# Patient Record
Sex: FEMALE | URBAN - METROPOLITAN AREA
[De-identification: names, ages, dates, MRNs, and addresses within clinical notes are randomized per-mention and may not be internally consistent; named-entity substitution may affect disease eponyms.]

---

## 2017-01-02 ENCOUNTER — COMMUNICATION - HEALTHEAST (OUTPATIENT)
Dept: PALLIATIVE MEDICINE | Facility: CLINIC | Age: 52
End: 2017-01-02

## 2017-01-02 DIAGNOSIS — G89.29 CHRONIC PAIN: ICD-10-CM

## 2017-01-09 ENCOUNTER — COMMUNICATION - HEALTHEAST (OUTPATIENT)
Dept: PALLIATIVE MEDICINE | Facility: CLINIC | Age: 52
End: 2017-01-09

## 2017-01-12 ENCOUNTER — COMMUNICATION - HEALTHEAST (OUTPATIENT)
Dept: PALLIATIVE MEDICINE | Facility: OTHER | Age: 52
End: 2017-01-12

## 2017-01-12 DIAGNOSIS — G89.29 CHRONIC PAIN: ICD-10-CM

## 2017-02-06 ENCOUNTER — HOSPITAL ENCOUNTER (OUTPATIENT)
Dept: PALLIATIVE MEDICINE | Facility: OTHER | Age: 52
Discharge: HOME | End: 2017-02-06
Attending: NURSE PRACTITIONER

## 2017-02-06 ENCOUNTER — COMMUNICATION - HEALTHEAST (OUTPATIENT)
Dept: TELEHEALTH | Facility: CLINIC | Age: 52
End: 2017-02-06

## 2017-02-06 DIAGNOSIS — G89.29 OTHER CHRONIC PAIN: ICD-10-CM

## 2017-02-06 DIAGNOSIS — M51.379 DEGENERATION OF LUMBAR OR LUMBOSACRAL INTERVERTEBRAL DISC: ICD-10-CM

## 2017-02-06 DIAGNOSIS — M46.1 SACROILIITIS, NOT ELSEWHERE CLASSIFIED (H): ICD-10-CM

## 2017-02-06 DIAGNOSIS — G89.4 CHRONIC PAIN SYNDROME: ICD-10-CM

## 2017-02-06 ASSESSMENT — MIFFLIN-ST. JEOR: SCORE: 1271.28

## 2017-03-31 ENCOUNTER — COMMUNICATION - HEALTHEAST (OUTPATIENT)
Dept: PALLIATIVE MEDICINE | Facility: CLINIC | Age: 52
End: 2017-03-31

## 2017-03-31 DIAGNOSIS — G89.29 CHRONIC PAIN: ICD-10-CM

## 2017-04-14 ENCOUNTER — COMMUNICATION - HEALTHEAST (OUTPATIENT)
Dept: PALLIATIVE MEDICINE | Facility: OTHER | Age: 52
End: 2017-04-14

## 2017-04-14 DIAGNOSIS — G89.29 CHRONIC PAIN: ICD-10-CM

## 2017-04-24 ENCOUNTER — HOSPITAL ENCOUNTER (OUTPATIENT)
Dept: PALLIATIVE MEDICINE | Facility: OTHER | Age: 52
Discharge: HOME | End: 2017-04-24
Attending: NURSE PRACTITIONER

## 2017-04-24 DIAGNOSIS — G89.4 CHRONIC PAIN SYNDROME: ICD-10-CM

## 2017-04-24 DIAGNOSIS — K86.1 CHRONIC PANCREATITIS, UNSPECIFIED PANCREATITIS TYPE (H): ICD-10-CM

## 2017-04-24 ASSESSMENT — MIFFLIN-ST. JEOR: SCORE: 1316.64

## 2017-06-28 ENCOUNTER — HOSPITAL ENCOUNTER (OUTPATIENT)
Dept: PALLIATIVE MEDICINE | Facility: OTHER | Age: 52
Discharge: HOME | End: 2017-06-28
Attending: NURSE PRACTITIONER

## 2017-06-28 DIAGNOSIS — M51.379 DEGENERATION OF LUMBAR OR LUMBOSACRAL INTERVERTEBRAL DISC: ICD-10-CM

## 2017-06-28 DIAGNOSIS — K85.90 PANCREATITIS: ICD-10-CM

## 2017-06-28 DIAGNOSIS — M50.30 DEGENERATION OF CERVICAL INTERVERTEBRAL DISC: ICD-10-CM

## 2017-06-28 DIAGNOSIS — G89.4 CHRONIC PAIN SYNDROME: ICD-10-CM

## 2017-06-28 DIAGNOSIS — M46.1 SACROILIITIS, NOT ELSEWHERE CLASSIFIED (H): ICD-10-CM

## 2017-06-28 ASSESSMENT — MIFFLIN-ST. JEOR: SCORE: 1293.96

## 2017-07-03 ENCOUNTER — COMMUNICATION - HEALTHEAST (OUTPATIENT)
Dept: PALLIATIVE MEDICINE | Facility: OTHER | Age: 52
End: 2017-07-03

## 2017-07-03 DIAGNOSIS — G89.29 CHRONIC PAIN: ICD-10-CM

## 2017-07-21 ENCOUNTER — COMMUNICATION - HEALTHEAST (OUTPATIENT)
Dept: PALLIATIVE MEDICINE | Facility: OTHER | Age: 52
End: 2017-07-21

## 2017-07-21 DIAGNOSIS — G89.29 CHRONIC PAIN: ICD-10-CM

## 2017-08-08 ENCOUNTER — HOSPITAL ENCOUNTER (OUTPATIENT)
Dept: PALLIATIVE MEDICINE | Facility: OTHER | Age: 52
Discharge: HOME | End: 2017-08-08
Attending: COUNSELOR

## 2017-08-08 DIAGNOSIS — F41.0 PANIC ATTACKS: ICD-10-CM

## 2017-08-08 DIAGNOSIS — F41.1 GENERALIZED ANXIETY DISORDER: ICD-10-CM

## 2017-08-08 DIAGNOSIS — F06.30 MOOD DISORDER DUE TO MEDICAL CONDITION: ICD-10-CM

## 2017-08-08 DIAGNOSIS — G89.4 CHRONIC PAIN SYNDROME: ICD-10-CM

## 2017-08-16 ENCOUNTER — HOSPITAL ENCOUNTER (OUTPATIENT)
Dept: PALLIATIVE MEDICINE | Facility: OTHER | Age: 52
Discharge: HOME | End: 2017-08-16
Attending: COUNSELOR

## 2017-08-16 ENCOUNTER — AMBULATORY - HEALTHEAST (OUTPATIENT)
Dept: PALLIATIVE MEDICINE | Facility: OTHER | Age: 52
End: 2017-08-16

## 2017-08-16 DIAGNOSIS — G89.4 CHRONIC PAIN SYNDROME: ICD-10-CM

## 2017-08-16 DIAGNOSIS — F41.1 GENERALIZED ANXIETY DISORDER: ICD-10-CM

## 2017-08-16 DIAGNOSIS — F06.30 MOOD DISORDER DUE TO A GENERAL MEDICAL CONDITION: ICD-10-CM

## 2017-08-16 DIAGNOSIS — F41.0 PANIC ATTACKS: ICD-10-CM

## 2017-08-30 ENCOUNTER — HOSPITAL ENCOUNTER (OUTPATIENT)
Dept: PALLIATIVE MEDICINE | Facility: OTHER | Age: 52
Discharge: HOME | End: 2017-08-30
Attending: COUNSELOR

## 2017-08-30 DIAGNOSIS — G89.4 CHRONIC PAIN SYNDROME: ICD-10-CM

## 2017-08-30 DIAGNOSIS — F41.1 GENERALIZED ANXIETY DISORDER: ICD-10-CM

## 2017-08-30 DIAGNOSIS — F41.0 PANIC ATTACKS: ICD-10-CM

## 2017-08-30 DIAGNOSIS — F06.30 MOOD DISORDER DUE TO A GENERAL MEDICAL CONDITION: ICD-10-CM

## 2017-09-15 ENCOUNTER — HOSPITAL ENCOUNTER (OUTPATIENT)
Dept: PALLIATIVE MEDICINE | Facility: OTHER | Age: 52
Discharge: HOME | End: 2017-09-15
Attending: COUNSELOR

## 2017-09-15 DIAGNOSIS — G89.4 CHRONIC PAIN SYNDROME: ICD-10-CM

## 2017-09-15 DIAGNOSIS — F41.0 PANIC ATTACKS: ICD-10-CM

## 2017-09-15 DIAGNOSIS — F41.1 GENERALIZED ANXIETY DISORDER: ICD-10-CM

## 2017-09-15 DIAGNOSIS — F06.30 MOOD DISORDER DUE TO A GENERAL MEDICAL CONDITION: ICD-10-CM

## 2017-10-04 ENCOUNTER — HOSPITAL ENCOUNTER (OUTPATIENT)
Dept: PALLIATIVE MEDICINE | Facility: OTHER | Age: 52
Discharge: HOME | End: 2017-10-04
Attending: COUNSELOR

## 2017-10-04 DIAGNOSIS — F06.30 MOOD DISORDER DUE TO A GENERAL MEDICAL CONDITION: ICD-10-CM

## 2017-10-04 DIAGNOSIS — F41.0 PANIC ATTACKS: ICD-10-CM

## 2017-10-04 DIAGNOSIS — F41.1 GENERALIZED ANXIETY DISORDER: ICD-10-CM

## 2017-10-04 DIAGNOSIS — G89.4 CHRONIC PAIN SYNDROME: ICD-10-CM

## 2017-10-13 ENCOUNTER — HOSPITAL ENCOUNTER (OUTPATIENT)
Dept: PALLIATIVE MEDICINE | Facility: OTHER | Age: 52
Discharge: HOME | End: 2017-10-13
Attending: NURSE PRACTITIONER

## 2017-10-13 DIAGNOSIS — M51.379 DEGENERATION OF LUMBAR OR LUMBOSACRAL INTERVERTEBRAL DISC: ICD-10-CM

## 2017-10-13 DIAGNOSIS — M46.1 SACROILIITIS, NOT ELSEWHERE CLASSIFIED (H): ICD-10-CM

## 2017-10-13 DIAGNOSIS — M79.10 MYALGIA: ICD-10-CM

## 2017-10-13 DIAGNOSIS — G89.29 CHRONIC PAIN: ICD-10-CM

## 2017-10-13 ASSESSMENT — MIFFLIN-ST. JEOR: SCORE: 1293.96

## 2017-10-30 ENCOUNTER — COMMUNICATION - HEALTHEAST (OUTPATIENT)
Dept: PALLIATIVE MEDICINE | Facility: OTHER | Age: 52
End: 2017-10-30

## 2017-10-30 DIAGNOSIS — G89.29 CHRONIC PAIN: ICD-10-CM

## 2017-11-01 ENCOUNTER — HOSPITAL ENCOUNTER (OUTPATIENT)
Dept: PALLIATIVE MEDICINE | Facility: OTHER | Age: 52
Discharge: HOME | End: 2017-11-01
Attending: COUNSELOR

## 2017-11-01 DIAGNOSIS — F41.1 GENERALIZED ANXIETY DISORDER: ICD-10-CM

## 2017-11-01 DIAGNOSIS — F06.30 MOOD DISORDER DUE TO A GENERAL MEDICAL CONDITION: ICD-10-CM

## 2017-11-01 DIAGNOSIS — F41.0 PANIC ATTACKS: ICD-10-CM

## 2017-11-01 DIAGNOSIS — G89.4 CHRONIC PAIN SYNDROME: ICD-10-CM

## 2017-11-03 ENCOUNTER — COMMUNICATION - HEALTHEAST (OUTPATIENT)
Dept: PALLIATIVE MEDICINE | Facility: OTHER | Age: 52
End: 2017-11-03

## 2017-11-03 DIAGNOSIS — Z72.0 TOBACCO ABUSE: ICD-10-CM

## 2017-11-03 DIAGNOSIS — G89.29 CHRONIC PAIN: ICD-10-CM

## 2017-11-03 DIAGNOSIS — K85.90 PANCREATITIS: ICD-10-CM

## 2017-11-07 ENCOUNTER — COMMUNICATION - HEALTHEAST (OUTPATIENT)
Dept: PALLIATIVE MEDICINE | Facility: OTHER | Age: 52
End: 2017-11-07

## 2017-11-08 ENCOUNTER — HOSPITAL ENCOUNTER (OUTPATIENT)
Dept: PALLIATIVE MEDICINE | Facility: OTHER | Age: 52
Discharge: HOME | End: 2017-11-08
Attending: PAIN MEDICINE

## 2017-11-08 DIAGNOSIS — M79.18 MYOFASCIAL PAIN: ICD-10-CM

## 2017-11-09 ENCOUNTER — COMMUNICATION - HEALTHEAST (OUTPATIENT)
Dept: PALLIATIVE MEDICINE | Facility: OTHER | Age: 52
End: 2017-11-09

## 2017-11-22 ENCOUNTER — AMBULATORY - HEALTHEAST (OUTPATIENT)
Dept: PALLIATIVE MEDICINE | Facility: OTHER | Age: 52
End: 2017-11-22

## 2017-11-27 ENCOUNTER — COMMUNICATION - HEALTHEAST (OUTPATIENT)
Dept: PALLIATIVE MEDICINE | Facility: OTHER | Age: 52
End: 2017-11-27

## 2017-11-29 ENCOUNTER — HOSPITAL ENCOUNTER (OUTPATIENT)
Dept: PALLIATIVE MEDICINE | Facility: OTHER | Age: 52
Discharge: HOME | End: 2017-11-29
Attending: COUNSELOR

## 2017-11-29 DIAGNOSIS — F06.30 MOOD DISORDER DUE TO A GENERAL MEDICAL CONDITION: ICD-10-CM

## 2017-11-29 DIAGNOSIS — F41.1 GENERALIZED ANXIETY DISORDER: ICD-10-CM

## 2017-11-29 DIAGNOSIS — Z87.820 HISTORY OF TRAUMATIC BRAIN INJURY: ICD-10-CM

## 2017-11-29 DIAGNOSIS — G89.4 CHRONIC PAIN SYNDROME: ICD-10-CM

## 2017-12-15 ENCOUNTER — HOSPITAL ENCOUNTER (OUTPATIENT)
Dept: PALLIATIVE MEDICINE | Facility: OTHER | Age: 52
Discharge: HOME | End: 2017-12-15
Attending: NURSE PRACTITIONER

## 2017-12-15 DIAGNOSIS — M46.1 SACROILIITIS, NOT ELSEWHERE CLASSIFIED (H): ICD-10-CM

## 2017-12-15 DIAGNOSIS — G89.29 CHRONIC PAIN: ICD-10-CM

## 2017-12-15 DIAGNOSIS — M51.379 DEGENERATION OF LUMBAR OR LUMBOSACRAL INTERVERTEBRAL DISC: ICD-10-CM

## 2017-12-15 ASSESSMENT — MIFFLIN-ST. JEOR: SCORE: 1293.96

## 2018-01-10 ENCOUNTER — HOSPITAL ENCOUNTER (OUTPATIENT)
Dept: PALLIATIVE MEDICINE | Facility: OTHER | Age: 53
Discharge: HOME | End: 2018-01-10
Attending: COUNSELOR

## 2018-01-10 ENCOUNTER — AMBULATORY - HEALTHEAST (OUTPATIENT)
Dept: PALLIATIVE MEDICINE | Facility: OTHER | Age: 53
End: 2018-01-10

## 2018-01-10 DIAGNOSIS — F41.0 PANIC ATTACKS: ICD-10-CM

## 2018-01-10 DIAGNOSIS — F06.30 MOOD DISORDER DUE TO A GENERAL MEDICAL CONDITION: ICD-10-CM

## 2018-01-10 DIAGNOSIS — G89.4 CHRONIC PAIN SYNDROME: ICD-10-CM

## 2018-01-10 DIAGNOSIS — F41.1 GENERALIZED ANXIETY DISORDER: ICD-10-CM

## 2018-02-24 ENCOUNTER — CARE COORDINATION (OUTPATIENT)
Dept: GASTROENTEROLOGY | Facility: CLINIC | Age: 53
End: 2018-02-24

## 2018-02-24 NOTE — PROGRESS NOTES
"Returning patient: message from call center -   Pt was last seen by Dr. Browning in March on 2016. Pt states she is now being referred back to Dr. Browning by Dr Sanches at [facility]. Pt stated she \"does not care who she sees but has seen Nam in the past\".  Pt stated that Dr Sanches did mention that pt should consider the [place] Procedure.     I did ask pt to have her recent records from [facility] sent and she stated all of her records are available via [facility]. Pt can be reached at [phone]       2/24/2018: some records are in [facility]where: no imaging can be seen in [facility]where- she will need to send all imaging to us for review.   - routed to Dr. Dumont to review and advise in the mean time.     Julissa TODD RN Coordinator  Dr. Dumont, Dr. Browning & Dr. Hdz   [facility]  [phone]          "

## 2018-03-02 ENCOUNTER — HOSPITAL ENCOUNTER (OUTPATIENT)
Dept: PALLIATIVE MEDICINE | Facility: OTHER | Age: 53
Discharge: HOME | End: 2018-03-02
Attending: COUNSELOR

## 2018-03-02 DIAGNOSIS — G89.4 CHRONIC PAIN SYNDROME: ICD-10-CM

## 2018-03-02 DIAGNOSIS — F41.0 PANIC ATTACKS: ICD-10-CM

## 2018-03-02 DIAGNOSIS — F06.30 MOOD DISORDER DUE TO A GENERAL MEDICAL CONDITION: ICD-10-CM

## 2018-03-02 DIAGNOSIS — F41.1 GENERALIZED ANXIETY DISORDER: ICD-10-CM

## 2018-03-21 ENCOUNTER — HOSPITAL ENCOUNTER (OUTPATIENT)
Dept: PALLIATIVE MEDICINE | Facility: OTHER | Age: 53
Discharge: HOME | End: 2018-03-21
Attending: NURSE PRACTITIONER

## 2018-03-21 DIAGNOSIS — M50.30 DEGENERATION OF CERVICAL INTERVERTEBRAL DISC: ICD-10-CM

## 2018-03-21 DIAGNOSIS — M51.379 DEGENERATION OF LUMBAR OR LUMBOSACRAL INTERVERTEBRAL DISC: ICD-10-CM

## 2018-03-21 DIAGNOSIS — G89.29 OTHER CHRONIC PAIN: ICD-10-CM

## 2018-03-21 DIAGNOSIS — Z87.19 H/O CHRONIC PANCREATITIS: ICD-10-CM

## 2018-03-21 DIAGNOSIS — M17.12 OSTEOARTHRITIS OF LEFT KNEE, UNSPECIFIED OSTEOARTHRITIS TYPE: ICD-10-CM

## 2018-03-21 ASSESSMENT — MIFFLIN-ST. JEOR: SCORE: 1293.96

## 2018-05-04 ENCOUNTER — COMMUNICATION - HEALTHEAST (OUTPATIENT)
Dept: PALLIATIVE MEDICINE | Facility: OTHER | Age: 53
End: 2018-05-04

## 2018-05-04 DIAGNOSIS — G89.29 CHRONIC PAIN: ICD-10-CM

## 2018-06-01 ENCOUNTER — HOSPITAL ENCOUNTER (OUTPATIENT)
Dept: PALLIATIVE MEDICINE | Facility: OTHER | Age: 53
Discharge: HOME | End: 2018-06-01
Attending: COUNSELOR

## 2018-06-01 ENCOUNTER — AMBULATORY - HEALTHEAST (OUTPATIENT)
Dept: PALLIATIVE MEDICINE | Facility: OTHER | Age: 53
End: 2018-06-01

## 2018-06-01 DIAGNOSIS — F06.30 MOOD DISORDER DUE TO A GENERAL MEDICAL CONDITION: ICD-10-CM

## 2018-06-01 DIAGNOSIS — G89.4 CHRONIC PAIN SYNDROME: ICD-10-CM

## 2018-06-01 DIAGNOSIS — Z87.820 HISTORY OF TRAUMATIC BRAIN INJURY: ICD-10-CM

## 2018-06-01 DIAGNOSIS — F41.0 PANIC ATTACKS: ICD-10-CM

## 2018-06-01 DIAGNOSIS — F41.1 GENERALIZED ANXIETY DISORDER: ICD-10-CM

## 2018-06-22 ENCOUNTER — HOSPITAL ENCOUNTER (OUTPATIENT)
Dept: PALLIATIVE MEDICINE | Facility: OTHER | Age: 53
Discharge: HOME | End: 2018-06-22
Attending: COUNSELOR

## 2018-06-22 DIAGNOSIS — F06.30 MOOD DISORDER DUE TO A GENERAL MEDICAL CONDITION: ICD-10-CM

## 2018-06-22 DIAGNOSIS — Z87.820 HISTORY OF TRAUMATIC BRAIN INJURY: ICD-10-CM

## 2018-06-22 DIAGNOSIS — F41.0 PANIC ATTACKS: ICD-10-CM

## 2018-06-22 DIAGNOSIS — G89.4 CHRONIC PAIN SYNDROME: ICD-10-CM

## 2018-06-22 DIAGNOSIS — F41.1 GAD (GENERALIZED ANXIETY DISORDER): ICD-10-CM

## 2018-07-02 ENCOUNTER — COMMUNICATION - HEALTHEAST (OUTPATIENT)
Dept: PALLIATIVE MEDICINE | Facility: OTHER | Age: 53
End: 2018-07-02

## 2018-07-11 ENCOUNTER — HOSPITAL ENCOUNTER (OUTPATIENT)
Dept: PALLIATIVE MEDICINE | Facility: OTHER | Age: 53
Discharge: HOME | End: 2018-07-11
Attending: COUNSELOR

## 2018-07-11 DIAGNOSIS — Z87.820 HISTORY OF TRAUMATIC BRAIN INJURY: ICD-10-CM

## 2018-07-11 DIAGNOSIS — G89.4 CHRONIC PAIN SYNDROME: ICD-10-CM

## 2018-07-11 DIAGNOSIS — F41.0 PANIC ATTACKS: ICD-10-CM

## 2018-07-11 DIAGNOSIS — F41.1 GAD (GENERALIZED ANXIETY DISORDER): ICD-10-CM

## 2018-07-11 DIAGNOSIS — F06.30 MOOD DISORDER DUE TO A GENERAL MEDICAL CONDITION: ICD-10-CM

## 2018-07-31 ENCOUNTER — COMMUNICATION - HEALTHEAST (OUTPATIENT)
Dept: PALLIATIVE MEDICINE | Facility: OTHER | Age: 53
End: 2018-07-31

## 2018-08-03 ENCOUNTER — HOSPITAL ENCOUNTER (OUTPATIENT)
Dept: PALLIATIVE MEDICINE | Facility: OTHER | Age: 53
Discharge: HOME | End: 2018-08-03
Attending: COUNSELOR

## 2018-08-03 DIAGNOSIS — Z87.820 HISTORY OF TRAUMATIC BRAIN INJURY: ICD-10-CM

## 2018-08-03 DIAGNOSIS — G89.4 CHRONIC PAIN SYNDROME: ICD-10-CM

## 2018-08-03 DIAGNOSIS — F41.0 PANIC ATTACKS: ICD-10-CM

## 2018-08-03 DIAGNOSIS — F06.30 MOOD DISORDER DUE TO MEDICAL CONDITION: ICD-10-CM

## 2018-08-03 DIAGNOSIS — F41.1 GAD (GENERALIZED ANXIETY DISORDER): ICD-10-CM

## 2018-08-23 ENCOUNTER — HOSPITAL ENCOUNTER (OUTPATIENT)
Dept: PALLIATIVE MEDICINE | Facility: OTHER | Age: 53
Discharge: HOME | End: 2018-08-23
Attending: COUNSELOR

## 2018-08-23 ENCOUNTER — RECORDS - HEALTHEAST (OUTPATIENT)
Dept: ADMINISTRATIVE | Facility: OTHER | Age: 53
End: 2018-08-23

## 2018-08-23 DIAGNOSIS — Z87.828 HISTORY OF HEAD INJURY: ICD-10-CM

## 2018-08-23 DIAGNOSIS — G89.4 CHRONIC PAIN SYNDROME: ICD-10-CM

## 2018-08-23 DIAGNOSIS — F41.1 GAD (GENERALIZED ANXIETY DISORDER): ICD-10-CM

## 2018-08-23 DIAGNOSIS — F41.0 PANIC ATTACKS: ICD-10-CM

## 2018-08-23 DIAGNOSIS — F06.30 MOOD DISORDER DUE TO A GENERAL MEDICAL CONDITION: ICD-10-CM

## 2018-09-21 ENCOUNTER — HOSPITAL ENCOUNTER (OUTPATIENT)
Dept: PALLIATIVE MEDICINE | Facility: OTHER | Age: 53
Discharge: HOME | End: 2018-09-21
Attending: NURSE PRACTITIONER

## 2018-09-21 DIAGNOSIS — M46.1 SACROILIITIS, NOT ELSEWHERE CLASSIFIED (H): ICD-10-CM

## 2018-09-21 DIAGNOSIS — M51.379 DEGENERATION OF LUMBAR OR LUMBOSACRAL INTERVERTEBRAL DISC: ICD-10-CM

## 2018-09-21 ASSESSMENT — MIFFLIN-ST. JEOR: SCORE: 1293.96

## 2018-09-27 ENCOUNTER — AMBULATORY - HEALTHEAST (OUTPATIENT)
Dept: PALLIATIVE MEDICINE | Facility: OTHER | Age: 53
End: 2018-09-27

## 2018-09-27 ENCOUNTER — HOSPITAL ENCOUNTER (OUTPATIENT)
Dept: PALLIATIVE MEDICINE | Facility: OTHER | Age: 53
Discharge: HOME | End: 2018-09-27
Attending: COUNSELOR

## 2018-09-27 DIAGNOSIS — F06.30 MOOD DISORDER DUE TO A GENERAL MEDICAL CONDITION: ICD-10-CM

## 2018-09-27 DIAGNOSIS — G89.4 CHRONIC PAIN SYNDROME: ICD-10-CM

## 2018-09-27 DIAGNOSIS — F41.0 PANIC ATTACKS: ICD-10-CM

## 2018-09-27 DIAGNOSIS — F43.12 CHRONIC POST-TRAUMATIC STRESS DISORDER (PTSD): ICD-10-CM

## 2018-09-27 DIAGNOSIS — F41.1 GAD (GENERALIZED ANXIETY DISORDER): ICD-10-CM

## 2018-09-27 DIAGNOSIS — Z87.820 HISTORY OF TRAUMATIC BRAIN INJURY: ICD-10-CM

## 2018-10-12 ENCOUNTER — HOSPITAL ENCOUNTER (OUTPATIENT)
Dept: PALLIATIVE MEDICINE | Facility: OTHER | Age: 53
Discharge: HOME | End: 2018-10-12
Attending: COUNSELOR

## 2018-10-12 DIAGNOSIS — F41.0 PANIC ATTACKS: ICD-10-CM

## 2018-10-12 DIAGNOSIS — G89.4 CHRONIC PAIN SYNDROME: ICD-10-CM

## 2018-10-12 DIAGNOSIS — F06.30 MOOD DISORDER DUE TO A GENERAL MEDICAL CONDITION: ICD-10-CM

## 2018-10-12 DIAGNOSIS — Z87.820 HISTORY OF TRAUMATIC BRAIN INJURY: ICD-10-CM

## 2018-10-12 DIAGNOSIS — F41.1 GAD (GENERALIZED ANXIETY DISORDER): ICD-10-CM

## 2018-10-12 DIAGNOSIS — F43.12 CHRONIC POST-TRAUMATIC STRESS DISORDER (PTSD): ICD-10-CM

## 2018-11-27 ENCOUNTER — HOSPITAL ENCOUNTER (OUTPATIENT)
Dept: PALLIATIVE MEDICINE | Facility: OTHER | Age: 53
Discharge: HOME | End: 2018-11-27
Attending: COUNSELOR

## 2018-11-27 DIAGNOSIS — G89.4 CHRONIC PAIN SYNDROME: ICD-10-CM

## 2018-11-27 DIAGNOSIS — F43.12 CHRONIC POST-TRAUMATIC STRESS DISORDER (PTSD): ICD-10-CM

## 2018-11-27 DIAGNOSIS — F41.0 PANIC ATTACKS: ICD-10-CM

## 2018-11-27 DIAGNOSIS — Z87.828 HISTORY OF HEAD INJURY: ICD-10-CM

## 2018-11-27 DIAGNOSIS — F06.30 MOOD DISORDER DUE TO MEDICAL CONDITION: ICD-10-CM

## 2018-11-27 DIAGNOSIS — F41.1 GENERALIZED ANXIETY DISORDER: ICD-10-CM

## 2018-11-30 ENCOUNTER — COMMUNICATION - HEALTHEAST (OUTPATIENT)
Dept: PALLIATIVE MEDICINE | Facility: OTHER | Age: 53
End: 2018-11-30

## 2018-12-12 ENCOUNTER — HOSPITAL ENCOUNTER (OUTPATIENT)
Dept: PALLIATIVE MEDICINE | Facility: OTHER | Age: 53
Discharge: HOME | End: 2018-12-12
Attending: COUNSELOR

## 2018-12-12 DIAGNOSIS — F43.10 PTSD (POST-TRAUMATIC STRESS DISORDER): ICD-10-CM

## 2018-12-12 DIAGNOSIS — F41.0 PANIC ATTACKS: ICD-10-CM

## 2018-12-12 DIAGNOSIS — F06.30 MOOD DISORDER DUE TO A GENERAL MEDICAL CONDITION: ICD-10-CM

## 2018-12-12 DIAGNOSIS — G89.4 CHRONIC PAIN SYNDROME: ICD-10-CM

## 2018-12-12 DIAGNOSIS — F41.1 GENERALIZED ANXIETY DISORDER: ICD-10-CM

## 2018-12-12 DIAGNOSIS — Z87.828 HISTORY OF HEAD INJURY: ICD-10-CM

## 2019-01-07 ENCOUNTER — HOSPITAL ENCOUNTER (OUTPATIENT)
Dept: PALLIATIVE MEDICINE | Facility: OTHER | Age: 54
Discharge: HOME | End: 2019-01-07
Attending: COUNSELOR

## 2019-01-07 DIAGNOSIS — F41.0 PANIC ATTACKS: ICD-10-CM

## 2019-01-07 DIAGNOSIS — F41.1 GENERALIZED ANXIETY DISORDER: ICD-10-CM

## 2019-01-07 DIAGNOSIS — Z87.828 HISTORY OF HEAD INJURY: ICD-10-CM

## 2019-01-07 DIAGNOSIS — G89.4 CHRONIC PAIN SYNDROME: ICD-10-CM

## 2019-01-07 DIAGNOSIS — F43.12 CHRONIC POST-TRAUMATIC STRESS DISORDER (PTSD): ICD-10-CM

## 2019-01-07 DIAGNOSIS — F06.30 MOOD DISORDER DUE TO A GENERAL MEDICAL CONDITION: ICD-10-CM

## 2019-01-29 ENCOUNTER — HOSPITAL ENCOUNTER (OUTPATIENT)
Dept: PALLIATIVE MEDICINE | Facility: OTHER | Age: 54
Discharge: HOME | End: 2019-01-29
Attending: COUNSELOR

## 2019-01-29 ENCOUNTER — AMBULATORY - HEALTHEAST (OUTPATIENT)
Dept: PALLIATIVE MEDICINE | Facility: OTHER | Age: 54
End: 2019-01-29

## 2019-01-29 DIAGNOSIS — F43.12 CHRONIC POST-TRAUMATIC STRESS DISORDER (PTSD): ICD-10-CM

## 2019-01-29 DIAGNOSIS — G89.4 CHRONIC PAIN SYNDROME: ICD-10-CM

## 2019-01-29 DIAGNOSIS — Z87.828 HISTORY OF HEAD INJURY: ICD-10-CM

## 2019-01-29 DIAGNOSIS — F41.0 PANIC ATTACKS: ICD-10-CM

## 2019-01-29 DIAGNOSIS — F06.30 MOOD DISORDER DUE TO A GENERAL MEDICAL CONDITION: ICD-10-CM

## 2019-01-29 DIAGNOSIS — F41.1 GENERALIZED ANXIETY DISORDER: ICD-10-CM

## 2019-02-14 ENCOUNTER — HOSPITAL ENCOUNTER (OUTPATIENT)
Dept: PALLIATIVE MEDICINE | Facility: OTHER | Age: 54
Discharge: HOME | End: 2019-02-14
Attending: COUNSELOR

## 2019-02-14 DIAGNOSIS — G89.4 CHRONIC PAIN SYNDROME: ICD-10-CM

## 2019-02-14 DIAGNOSIS — Z87.828 HISTORY OF HEAD INJURY: ICD-10-CM

## 2019-02-14 DIAGNOSIS — F43.12 CHRONIC POST-TRAUMATIC STRESS DISORDER (PTSD): ICD-10-CM

## 2019-02-14 DIAGNOSIS — F41.1 GENERALIZED ANXIETY DISORDER: ICD-10-CM

## 2019-02-14 DIAGNOSIS — F41.0 PANIC ATTACKS: ICD-10-CM

## 2019-02-14 DIAGNOSIS — F06.30 MOOD DISORDER DUE TO A GENERAL MEDICAL CONDITION: ICD-10-CM

## 2019-02-19 ASSESSMENT — MIFFLIN-ST. JEOR: SCORE: 1339.32

## 2019-02-21 ENCOUNTER — DOCUMENTATION ONLY (OUTPATIENT)
Dept: OTHER | Facility: CLINIC | Age: 54
End: 2019-02-21

## 2019-02-21 ENCOUNTER — SURGERY - HEALTHEAST (OUTPATIENT)
Dept: SURGERY | Facility: CLINIC | Age: 54
End: 2019-02-21

## 2019-02-21 ENCOUNTER — ANESTHESIA - HEALTHEAST (OUTPATIENT)
Dept: SURGERY | Facility: CLINIC | Age: 54
End: 2019-02-21

## 2019-02-21 ASSESSMENT — MIFFLIN-ST. JEOR: SCORE: 1335.63

## 2019-02-22 ASSESSMENT — MIFFLIN-ST. JEOR: SCORE: 1335.63

## 2019-05-23 ENCOUNTER — COMMUNICATION - HEALTHEAST (OUTPATIENT)
Dept: PALLIATIVE MEDICINE | Facility: OTHER | Age: 54
End: 2019-05-23

## 2019-06-01 ENCOUNTER — COMMUNICATION - HEALTHEAST (OUTPATIENT)
Dept: PALLIATIVE MEDICINE | Facility: OTHER | Age: 54
End: 2019-06-01

## 2019-06-01 DIAGNOSIS — G89.29 CHRONIC PAIN: ICD-10-CM

## 2019-06-04 ENCOUNTER — AMBULATORY - HEALTHEAST (OUTPATIENT)
Dept: PALLIATIVE MEDICINE | Facility: OTHER | Age: 54
End: 2019-06-04

## 2019-06-04 ENCOUNTER — HOSPITAL ENCOUNTER (OUTPATIENT)
Dept: PALLIATIVE MEDICINE | Facility: OTHER | Age: 54
Discharge: HOME | End: 2019-06-04
Attending: COUNSELOR

## 2019-06-04 DIAGNOSIS — F41.0 PANIC ATTACKS: ICD-10-CM

## 2019-06-04 DIAGNOSIS — F43.12 CHRONIC POST-TRAUMATIC STRESS DISORDER (PTSD): ICD-10-CM

## 2019-06-04 DIAGNOSIS — G89.4 CHRONIC PAIN SYNDROME: ICD-10-CM

## 2019-06-04 DIAGNOSIS — F41.1 GENERALIZED ANXIETY DISORDER: ICD-10-CM

## 2019-06-04 DIAGNOSIS — F33.2 SEVERE EPISODE OF RECURRENT MAJOR DEPRESSIVE DISORDER, WITHOUT PSYCHOTIC FEATURES (H): ICD-10-CM

## 2019-06-04 DIAGNOSIS — Z87.828 HISTORY OF HEAD INJURY: ICD-10-CM

## 2019-06-12 ENCOUNTER — HOSPITAL ENCOUNTER (OUTPATIENT)
Dept: PALLIATIVE MEDICINE | Facility: OTHER | Age: 54
Discharge: HOME | End: 2019-06-12
Attending: COUNSELOR

## 2019-06-12 DIAGNOSIS — F41.0 PANIC ATTACKS: ICD-10-CM

## 2019-06-12 DIAGNOSIS — Z87.828 HISTORY OF HEAD INJURY: ICD-10-CM

## 2019-06-12 DIAGNOSIS — F43.12 CHRONIC POST-TRAUMATIC STRESS DISORDER (PTSD): ICD-10-CM

## 2019-06-12 DIAGNOSIS — F33.2 SEVERE EPISODE OF RECURRENT MAJOR DEPRESSIVE DISORDER, WITHOUT PSYCHOTIC FEATURES (H): ICD-10-CM

## 2019-06-12 DIAGNOSIS — G89.4 CHRONIC PAIN SYNDROME: ICD-10-CM

## 2019-06-12 DIAGNOSIS — F41.1 GENERALIZED ANXIETY DISORDER: ICD-10-CM

## 2019-08-20 ENCOUNTER — RECORDS - HEALTHEAST (OUTPATIENT)
Dept: LAB | Facility: CLINIC | Age: 54
End: 2019-08-20

## 2019-08-20 LAB
APPEARANCE FLD: ABNORMAL
COLOR FLD: YELLOW
CRYSTALS SNV MICRO: ABNORMAL
GLUCOSE FLD-MCNC: 71 MG/DL
LYMPHOCYTES NFR FLD MANUAL: 54 %
MACROPHAGE % - HISTORICAL: 1 %
MONOCYTE % - HISTORICAL: 37 %
NEUTS BAND NFR FLD MANUAL: 8 %
PROT FLD-MCNC: 2.9 G/DL
RBC FLUID - HISTORICAL: ABNORMAL /UL
WBC # FLD AUTO: 388 /UL (ref 0–99)

## 2019-08-23 LAB
BACTERIA SPEC CULT: NO GROWTH
BACTERIA SPEC CULT: NORMAL
GRAM STAIN RESULT: NORMAL
GRAM STAIN RESULT: NORMAL

## 2019-09-11 ENCOUNTER — AMBULATORY - HEALTHEAST (OUTPATIENT)
Dept: PALLIATIVE MEDICINE | Facility: OTHER | Age: 54
End: 2019-09-11

## 2019-11-13 ASSESSMENT — MIFFLIN-ST. JEOR: SCORE: 1293.96

## 2019-11-18 ENCOUNTER — SURGERY - HEALTHEAST (OUTPATIENT)
Dept: SURGERY | Facility: CLINIC | Age: 54
End: 2019-11-18

## 2019-11-18 ENCOUNTER — ANESTHESIA - HEALTHEAST (OUTPATIENT)
Dept: SURGERY | Facility: CLINIC | Age: 54
End: 2019-11-18

## 2019-11-18 ASSESSMENT — MIFFLIN-ST. JEOR: SCORE: 1285.79

## 2019-11-19 ENCOUNTER — HOME CARE/HOSPICE - HEALTHEAST (OUTPATIENT)
Dept: HOME HEALTH SERVICES | Facility: HOME HEALTH | Age: 54
End: 2019-11-19

## 2019-11-22 ENCOUNTER — HOME CARE/HOSPICE - HEALTHEAST (OUTPATIENT)
Dept: HOME HEALTH SERVICES | Facility: HOME HEALTH | Age: 54
End: 2019-11-22

## 2019-11-24 ENCOUNTER — HOME CARE/HOSPICE - HEALTHEAST (OUTPATIENT)
Dept: HOME HEALTH SERVICES | Facility: HOME HEALTH | Age: 54
End: 2019-11-24

## 2019-11-26 ENCOUNTER — HOME CARE/HOSPICE - HEALTHEAST (OUTPATIENT)
Dept: HOME HEALTH SERVICES | Facility: HOME HEALTH | Age: 54
End: 2019-11-26

## 2019-11-27 ENCOUNTER — HOME CARE/HOSPICE - HEALTHEAST (OUTPATIENT)
Dept: HOME HEALTH SERVICES | Facility: HOME HEALTH | Age: 54
End: 2019-11-27

## 2019-11-29 ENCOUNTER — HOME CARE/HOSPICE - HEALTHEAST (OUTPATIENT)
Dept: HOME HEALTH SERVICES | Facility: HOME HEALTH | Age: 54
End: 2019-11-29

## 2019-12-03 ENCOUNTER — HOME CARE/HOSPICE - HEALTHEAST (OUTPATIENT)
Dept: HOME HEALTH SERVICES | Facility: HOME HEALTH | Age: 54
End: 2019-12-03

## 2019-12-05 ENCOUNTER — HOME CARE/HOSPICE - HEALTHEAST (OUTPATIENT)
Dept: HOME HEALTH SERVICES | Facility: HOME HEALTH | Age: 54
End: 2019-12-05

## 2019-12-10 ENCOUNTER — HOME CARE/HOSPICE - HEALTHEAST (OUTPATIENT)
Dept: HOME HEALTH SERVICES | Facility: HOME HEALTH | Age: 54
End: 2019-12-10

## 2019-12-13 ENCOUNTER — HOME CARE/HOSPICE - HEALTHEAST (OUTPATIENT)
Dept: HOME HEALTH SERVICES | Facility: HOME HEALTH | Age: 54
End: 2019-12-13

## 2020-03-10 ENCOUNTER — RECORDS - HEALTHEAST (OUTPATIENT)
Dept: LAB | Facility: CLINIC | Age: 55
End: 2020-03-10

## 2020-03-10 LAB
APPEARANCE FLD: ABNORMAL
COLOR FLD: ABNORMAL
CRYSTALS SNV MICRO: NORMAL
EOSINOPHIL NFR FLD MANUAL: 2 %
LYMPHOCYTES NFR FLD MANUAL: 71 %
MACROPHAGE % - HISTORICAL: 3 %
MESOTHELIALS, FLUID: ABNORMAL
MONOCYTE % - HISTORICAL: 8 %
NEUTS BAND NFR FLD MANUAL: 17 %
OTHER CELLS FLD MANUAL: ABNORMAL
RBC FLUID - HISTORICAL: ABNORMAL /UL
WBC # FLD AUTO: 152 /UL (ref 0–99)

## 2020-04-15 ENCOUNTER — AMBULATORY - HEALTHEAST (OUTPATIENT)
Dept: PALLIATIVE MEDICINE | Facility: OTHER | Age: 55
End: 2020-04-15

## 2020-09-29 ENCOUNTER — COMMUNICATION - HEALTHEAST (OUTPATIENT)
Dept: PALLIATIVE MEDICINE | Facility: OTHER | Age: 55
End: 2020-09-29

## 2020-10-15 ENCOUNTER — COMMUNICATION - HEALTHEAST (OUTPATIENT)
Dept: PALLIATIVE MEDICINE | Facility: OTHER | Age: 55
End: 2020-10-15

## 2020-10-16 ENCOUNTER — COMMUNICATION - HEALTHEAST (OUTPATIENT)
Dept: PALLIATIVE MEDICINE | Facility: OTHER | Age: 55
End: 2020-10-16

## 2020-10-22 ENCOUNTER — HOSPITAL ENCOUNTER (OUTPATIENT)
Dept: PALLIATIVE MEDICINE | Facility: OTHER | Age: 55
Discharge: HOME | End: 2020-10-22
Attending: COUNSELOR

## 2020-10-22 DIAGNOSIS — F33.2 SEVERE EPISODE OF RECURRENT MAJOR DEPRESSIVE DISORDER, WITHOUT PSYCHOTIC FEATURES (H): ICD-10-CM

## 2020-10-22 DIAGNOSIS — G89.4 CHRONIC PAIN SYNDROME: ICD-10-CM

## 2020-10-22 DIAGNOSIS — F41.0 PANIC ATTACKS: ICD-10-CM

## 2020-10-22 DIAGNOSIS — F41.1 GAD (GENERALIZED ANXIETY DISORDER): ICD-10-CM

## 2020-10-22 DIAGNOSIS — F43.12 CHRONIC POST-TRAUMATIC STRESS DISORDER (PTSD): ICD-10-CM

## 2020-10-26 ENCOUNTER — HOSPITAL ENCOUNTER (OUTPATIENT)
Dept: PALLIATIVE MEDICINE | Facility: OTHER | Age: 55
Discharge: HOME | End: 2020-10-26
Attending: COUNSELOR

## 2020-10-26 ENCOUNTER — AMBULATORY - HEALTHEAST (OUTPATIENT)
Dept: PALLIATIVE MEDICINE | Facility: OTHER | Age: 55
End: 2020-10-26

## 2020-10-26 DIAGNOSIS — F43.12 CHRONIC POST-TRAUMATIC STRESS DISORDER (PTSD): ICD-10-CM

## 2020-10-26 DIAGNOSIS — F33.2 SEVERE EPISODE OF RECURRENT MAJOR DEPRESSIVE DISORDER, WITHOUT PSYCHOTIC FEATURES (H): ICD-10-CM

## 2020-10-26 DIAGNOSIS — F41.1 GAD (GENERALIZED ANXIETY DISORDER): ICD-10-CM

## 2020-10-26 DIAGNOSIS — G89.4 CHRONIC PAIN SYNDROME: ICD-10-CM

## 2020-10-26 DIAGNOSIS — F41.0 PANIC ATTACKS: ICD-10-CM

## 2020-11-04 ENCOUNTER — HOSPITAL ENCOUNTER (OUTPATIENT)
Dept: PALLIATIVE MEDICINE | Facility: OTHER | Age: 55
Discharge: HOME | End: 2020-11-04
Attending: COUNSELOR

## 2020-11-04 DIAGNOSIS — F41.1 GAD (GENERALIZED ANXIETY DISORDER): ICD-10-CM

## 2020-11-04 DIAGNOSIS — F41.0 PANIC ATTACKS: ICD-10-CM

## 2020-11-04 DIAGNOSIS — F43.12 CHRONIC POST-TRAUMATIC STRESS DISORDER (PTSD): ICD-10-CM

## 2020-11-04 DIAGNOSIS — G89.4 CHRONIC PAIN SYNDROME: ICD-10-CM

## 2020-11-04 DIAGNOSIS — F33.2 SEVERE EPISODE OF RECURRENT MAJOR DEPRESSIVE DISORDER, WITHOUT PSYCHOTIC FEATURES (H): ICD-10-CM

## 2021-01-29 ENCOUNTER — AMBULATORY - HEALTHEAST (OUTPATIENT)
Dept: PALLIATIVE MEDICINE | Facility: OTHER | Age: 56
End: 2021-01-29

## 2021-02-12 ENCOUNTER — AMBULATORY - HEALTHEAST (OUTPATIENT)
Dept: PALLIATIVE MEDICINE | Facility: OTHER | Age: 56
End: 2021-02-12

## 2021-03-06 ENCOUNTER — IMMUNIZATION (OUTPATIENT)
Dept: NURSING | Facility: CLINIC | Age: 56
End: 2021-03-06
Payer: COMMERCIAL

## 2021-03-06 PROCEDURE — 0031A PR COVID VAC JANSSEN AD26 0.5ML: CPT

## 2021-03-06 PROCEDURE — 91303 PR COVID VAC JANSSEN AD26 0.5ML: CPT

## 2021-03-13 ENCOUNTER — HEALTH MAINTENANCE LETTER (OUTPATIENT)
Age: 56
End: 2021-03-13

## 2021-05-26 VITALS
DIASTOLIC BLOOD PRESSURE: 70 MMHG | OXYGEN SATURATION: 95 % | SYSTOLIC BLOOD PRESSURE: 120 MMHG | HEART RATE: 95 BPM | TEMPERATURE: 98.7 F

## 2021-05-29 ENCOUNTER — RECORDS - HEALTHEAST (OUTPATIENT)
Dept: ADMINISTRATIVE | Facility: CLINIC | Age: 56
End: 2021-05-29

## 2021-05-29 NOTE — PROGRESS NOTES
6/4/2019    Start time: 11:10 AM    Stop Time: 12:00 PM   Session # 4    Megan Burrell is a 53 y.o. female is being seen today for    Chief Complaint   Patient presents with     Mental Health Note   . Generalized Anxiety, Panic Attacks  Major Depression, Severe  Chronic pain syndrome  History of head injury  PTSD chronic          New symptoms or complaints: None    Functional Impairment:   Personal: 4  Family: 4  Work: 4  Social:4    Clinical assessment of mental status: Megan Burrell presented on time.  She was open and cooperative, and dressed appropriately for this session and weather. Her memory was in tact .  Her  speech was appropriate/quiet.  Language was appropriate.  Concentration and focus is brief and she was redirected at times. Psychosis is not noted or reported. She reports her mood is depressed and anxious .  Affect is congruent with speech.  Fund of knowledge is adequate. Insight is adequate for therapy.     Patient updated paperwork this session: PHQ-9 depression score is 18 and indicates severe symptoms of depression, TREVIN-7 anxiety score is 14 and  indicates severe symptoms of anxiety, WHODAS score is 45.83%, H1= 24, H2=6, H3=20, PANSI score indicates risk for suicide.     Suicidal/Homicidal Ideation present: Patient reports passive suicidal ideation. She denies immediate plan or means. Patient identifies protective factors. She agrees to call 911, go to the ER for evaluation or call the crisis connection if symptoms worsen or she is feeling unsafe.     Patient's impression of their current status: Patient reports she continues to experience symptoms of depression and anxiety related to personal, work and medical stressors. Patient continues to struggle with symptoms related to trauma history. She states she seldom leaves the house and has difficulty focusing. She reports continued difficulty with sleep.         Therapist impression of patients current state: Patient appears to be struggling with  her mental health and chronic pain symptoms. Patient talked more quietly than usual and reported difficulty with focus in session today. She reports lack of motivation/interest, she has loss of appetite, and she has poor sleep. Patient did report consistency with following through with one small goal per day. Patient seems to dwell on her fears. Her hygiene continues to be impacted due to depressed mood. She continues to stay isolated most days and stays mostly at home. Discussed ways to cope. Talking about her adult children seems to increase positive feelings, yet this is fleeting. She reports she has been working on using grounding skills and finding benefit for brief periods of time. Patient was encouraged to continue meditation/relaxation on a regular basis, as it does seem to benefit her. Patient seemed open with a discussion on personal values and she seemed to gain insight. It appears symptoms of anxiety are difficult to manage, much of the time, and stressors appear to trigger these symptoms including, looking for work, health problems and trauma history. Patient reports continued feelings of panic when she has nightmares (nightly) of past trauma at work. Patient does report the flashbacks have decreased. She followed up with knee surgery in February, but she has been struggling with swelling/pain. Patient processed thoughts and feelings about recent increase in pain symptoms. She processed thoughts and feelings about the changes in her life since her injury. Patient is currently looking for work and interviewing. Patient seems to have a desire to make a plan to quit smoking, and she continues to work on this. Challenged her to be mindful in daily living regarding her triggers and emotional responses to stressors. Presented getting in touch with the senses exercise and patient appeared to benefit. Encouraged regular attendance to gain most benefit from psychotherapy.       Type of psychotherapeutic  technique provided: Client centered, CBT and Mindfulness    Progress toward short term goals: Patient reports following her pain management plan of care. Patient reports working on implementing coping skills, including meditation, to manage mental health and chronic pain symptoms. She continues to work on at least one goal per day. She is addressing career/employment needs. Discussed patient's goals and treatment plan will be developed.         Review of long term goals: Discussed patient's goals and treatment plan will be developed.     Diagnosis: Generalized Anxiety, Panic Attacks  Major Depression, severe  Chronic pain syndrome  History of head injury  PTSD chronic        Plan and Follow up: Follow pain management plan of care. Continue psychotherapy sessions on a weekly basis to further address chronic pain, depression and anxiety symptoms. Continue EMDR therapy and will continue resourcing/assessment as appropriate. Implement healthy coping skills to manage symptoms. Practice grounding and meditation exercises in daily living. Consider meditation prior to bed to help with relaxation. Continue discussing plan for smoking cessation. Consider acupuncture services. Follow pain center plan of care as well as all other medical recommendations. Address medical concerns as needed. Continue with small, achievable goals as discussed in session. Continue discussion on career needs.            Discharge Criteria/Planning: Patient will continue with follow-up until therapy can be discontinued without return of signs and symptoms.    Urmila Salinas 6/4/2019

## 2021-05-29 NOTE — PROGRESS NOTES
D) Patient called and left a message. Called patient back and she reported she has been struggling with some physical and mental health concerns. Encouraged patient to use skills learned in previous sessions. Patient reported passive SI, however she reported no immediate plan or means. She was able to identify protective factors and report safety at this time. Encouraged patient to schedule an appointment and had scheduling call her to make an appointment for next week.

## 2021-05-29 NOTE — PROGRESS NOTES
6/12/2019    Start time: 9:10 AM    Stop Time: 10:00 AM   Session # 5    Megan Burrell is a 53 y.o. female is being seen today for    Chief Complaint   Patient presents with     Mental Health Note   . Generalized Anxiety, Panic Attacks  Major Depression, Severe  Chronic pain syndrome  History of head injury  PTSD chronic        New symptoms or complaints: None    Functional Impairment:   Personal: 4  Family: 4  Work: 4  Social:4    Clinical assessment of mental status: Megan Burrell presented on time.  She was open and cooperative, and dressed appropriately for this session and weather. Her memory was in tact .  Her  speech was appropriate/quiet.  Language was appropriate/quiet.  Concentration and focus is mostly on task, but was redirected at times. Psychosis is not noted or reported. She reports her mood is anxious and depressed .  Affect is congruent with speech.  Fund of knowledge is adequate. Insight is adequate for therapy.     Suicidal/Homicidal Ideation present: None Reported This Session    Patient's impression of their current status: Patient reports she continues to experience symptoms of depression and anxiety related to personal, work and medical stressors. Patient continues to struggle with symptoms related to trauma history and she has daily flashbacks. She states she seldom leaves the house and has difficulty focusing. She reports continued difficulty with sleep.         Therapist impression of patients current state: Patient appears to be struggling with her mental health and chronic pain symptoms. Patient talked quietly again today and reported difficulty with having energy today. She reports lack of motivation/interest, she has loss of appetite, and she has poor sleep. Patient did report consistency with following through with one small goal per day. Patient seems to dwell on her fears. Her hygiene continues to be impacted due to depressed mood. She continues to stay isolated most days and stays  mostly at home. Discussed ways to cope. Talking about her adult children seems to increase positive feelings, yet this is fleeting. She reports she has been working on using grounding skills and finding benefit for brief periods of time. Patient was encouraged to continue meditation/relaxation on a regular basis, as it does seem to benefit her. Patient seemed open with a discussion on personal values and she seemed to gain insight. It appears symptoms of anxiety are difficult to manage, much of the time, and stressors appear to trigger these symptoms including health problems and trauma history. She accepted a job recently and will be starting at the end of the month. Patient reports continued feelings of panic when she has nightmares (nightly) of past trauma at work. Patient does report the flashbacks have decreased, yet continue to be daily. She continues to struggle with knee pain. She has court next week regarding disability. She processed thoughts and feelings about the changes in her life since her injury. Patient seems to have a desire to make a plan to quit smoking, and she continues to work on this. Challenged her to be mindful in daily living regarding her triggers and emotional responses to stressors. Presented grounding exercise and patient seemed to benefit. Encouraged regular attendance to gain most benefit from psychotherapy.         Type of psychotherapeutic technique provided: Client centered, CBT and Mindfulness    Progress toward short term goals: Patient reports following her pain management plan of care. Patient reports working on implementing coping skills, including meditation, to manage mental health and chronic pain symptoms. She continues to work on at least one goal per day. She is starting a new job this month.        Review of long term goals: Not done at today's visit. Time did not permit to review treatment plan today. Paperwork was updated on 6/4/19.  Will review next session.      Diagnosis: Generalized Anxiety, Panic Attacks  Major Depression, Severe  Chronic pain syndrome  History of head injury  PTSD chronic      Plan and Follow up: Follow pain management plan of care. Continue psychotherapy sessions every 1-2 weeks to further address chronic pain, depression and anxiety symptoms. It is understood that she is starting a new job and will schedule appointments when she gets her new work schedule. Continue EMDR therapy and will continue resourcing/assessment as appropriate. Implement healthy coping skills to manage symptoms. Practice grounding and meditation exercises in daily living. Consider meditation prior to bed to help with relaxation. Continue discussing plan for smoking cessation. Follow pain center plan of care as well as all other medical recommendations. Address medical concerns as needed. Continue with small, achievable goals as discussed in session.              Discharge Criteria/Planning: Patient will continue with follow-up until therapy can be discontinued without return of signs and symptoms.    Urmila Salinas 6/12/2019

## 2021-05-30 ENCOUNTER — RECORDS - HEALTHEAST (OUTPATIENT)
Dept: ADMINISTRATIVE | Facility: CLINIC | Age: 56
End: 2021-05-30

## 2021-05-30 VITALS — HEIGHT: 65 IN | WEIGHT: 150 LBS | BODY MASS INDEX: 24.99 KG/M2

## 2021-05-30 VITALS — BODY MASS INDEX: 26.66 KG/M2 | HEIGHT: 65 IN | WEIGHT: 160 LBS

## 2021-05-30 NOTE — PROGRESS NOTES
Outpatient Mental Health Treatment Plan    Name:  Megan Burrell  :  1965  MRN:  [id number]    Treatment Plan:  Updated Treatment Plan  Intake/initial treatment plan date:  17  Benefit and risks and alternatives have been discussed: Yes  Is this treatment appropriate with minimal intrusion/restrictions: Yes  Estimated duration of treatment:  Approximately 20 sessions.  Anticipated frequency of services:  Every 1-2 weeks  Necessity for frequency: This frequency is needed to establish therapeutic goals and for continuity of care in order to monitor progress.  Necessity for treatment: To address cognitive, behavioral, and/or emotional barriers in order to work toward goals and to improve quality of life.    Session Type: Patient is presenting for an Individual session.    Plan:           ?   ? Anxiety    Goal:  Decrease average anxiety level from 9 to 5.   Strategies: ? [x]Learn and practice relaxation techniques and other coping        strategies (e.g., thought stopping, reframing, meditation)     ? [x] Increase involvement in meaningful activities     ? [x] Discuss sleep hygiene     ? [x] Explore thoughts and expectations about self and others     ? [x] Identify and monitor triggers for panic/anxiety symptoms     ? [x] Implement physical activity routine (with physician approval)     ? [x] Consider introduction of bibliotherapy and/or videos     ? [x] Continue compliance with medical treatment plan (or explore          barriers)                                       [x]     ?Degree to which this is a problem: 4  Degree to which goal is met: 1  Date of Review: 19     ? Depression    Goal:  Decrease average depression level from 8 to 5.   Strategies:    ?[x] Decrease social isolation     [x] Increase involvement in meaningful activities     ?[x] Discuss sleep hygiene     ?[x] Explore thoughts and expectations about self and others     ?[x] Process grief (loss of significant person, independence, role,  "       etc.)     ?[x] Assess for suicide risk     ?[x] Implement physical activity routine (with physician approval)     [x] Consider introduction of bibliotherapy and/or videos     [x] Continue compliance with medical treatment plan (or explore         barriers)       ?  Degree to which this is a problem: 4  Degree to which goal is met: 1  Date of Review: 6/4/19  Chronic pain  Goal:  ? Decrease average pain level from 8 to 5.   ? Increase % acceptance of pain from 50 to 80.   Strategies: ? [x] Explore thoughts and expectations about self and others    [x] Explore emotional reactions to illness/injury      ? [x] Learn and practice relaxation techniques and other coping          strategies            [x] Implement physical activity routine (with physician approval)     ? [x] Engage in values clarification and goal-setting     ? [x] Consider introduction of bibliotherapy and/or videos     ? [x] Increase involvement in meaningful activities     ? [x] Discuss sleep hygiene     ? [x] Process grief (loss of significant person, independence, role,          etc.)     ? [x] Assess for suicide risk     ?  Degree to which this is a problem: 4  Degree to which goal is met: 1  Date of Review: 6/4/19       Functional Impairment:  1=Not at all/Rarely  2=Some days  3=Most Days  4=Every Day    Personal : 4  Family : 4  Social : 4   Work/school : 4    Diagnosis:  Generalized Anxiety, Panic Attacks  Major Depression, Severe  Chronic pain syndrome  History of head injury  PTSD chronic       WHODAS 2.0 12-item version 45.83%      Scores presented in qualifiers to represent level of disability.  MODERATE Problem (medium, fair, ...) 25-49%    H1= 24  H2= 6  H3= 20        Clinical assessments and measures completed:. TREVIN-7, PHQ-9 and PANSI     Strengths:  Pony, reliable   Limitations:  \"I want to get back to who I was\".   Cultural Considerations: Patient is a 53 year old, ,  female, who has a history of anxiety, panic " attacks, depression, trauma, TBI and chronic pain concerns.        Persons responsible for this plan: Patient and Provider            Psychotherapist Signature           Patient Signature:              Guardian Signature             Provider: Performed and documented by Urmila Salinas [facility]   Date:  6/4/19

## 2021-05-31 ENCOUNTER — RECORDS - HEALTHEAST (OUTPATIENT)
Dept: ADMINISTRATIVE | Facility: CLINIC | Age: 56
End: 2021-05-31

## 2021-05-31 VITALS — HEIGHT: 65 IN | BODY MASS INDEX: 25.83 KG/M2 | WEIGHT: 155 LBS

## 2021-06-01 ENCOUNTER — RECORDS - HEALTHEAST (OUTPATIENT)
Dept: ADMINISTRATIVE | Facility: CLINIC | Age: 56
End: 2021-06-01

## 2021-06-01 VITALS — WEIGHT: 155 LBS | HEIGHT: 65 IN | BODY MASS INDEX: 25.83 KG/M2

## 2021-06-01 VITALS — BODY MASS INDEX: 25.83 KG/M2 | HEIGHT: 65 IN | WEIGHT: 155 LBS

## 2021-06-01 NOTE — PROGRESS NOTES
D) Sent discharge from psychotherapy services letter this date. Patient last attended psychotherapy on 6/12/19. Patient will be discharged on 9/30/19 if they do not schedule/return for services prior to that date.

## 2021-06-02 ENCOUNTER — RECORDS - HEALTHEAST (OUTPATIENT)
Dept: ADMINISTRATIVE | Facility: CLINIC | Age: 56
End: 2021-06-02

## 2021-06-02 VITALS — BODY MASS INDEX: 27.36 KG/M2 | WEIGHT: 164.19 LBS | HEIGHT: 65 IN

## 2021-06-03 ENCOUNTER — RECORDS - HEALTHEAST (OUTPATIENT)
Dept: ADMINISTRATIVE | Facility: CLINIC | Age: 56
End: 2021-06-03

## 2021-06-03 VITALS — BODY MASS INDEX: 25.52 KG/M2 | HEIGHT: 65 IN | WEIGHT: 153.2 LBS

## 2021-06-03 NOTE — ANESTHESIA PROCEDURE NOTES
Peripheral Block    Start time: 11/18/2019 10:00 AM  End time: 11/18/2019 10:03 AM  post-op analgesia per surgeon order as noted in medical record  Staffing:  Performing  Anesthesiologist: Mahad Melendez MD  Preanesthetic Checklist  Completed: patient identified, site marked, risks, benefits, and alternatives discussed, timeout performed, consent obtained, airway assessed, oxygen available, suction available, emergency drugs available and hand hygiene performed  Peripheral Block  Block type: saphenous, adductor canal block  Prep: ChloraPrep  Patient position: supine  Patient monitoring: blood pressure, heart rate and continuous pulse oximetry  Laterality: left  Injection technique: ultrasound guided    Ultrasound used to visualize needle placement in proximity to nerve being blocked: yes   US used to visualize anesthetic spread  Visualized anatomic structures normal  No Pathological Findings  Permanent ultrasound image captured for medical record  Sterile gel and probe cover used for ultrasound.  Needle  Needle type: Stimuplex   Needle gauge: 20G  Needle length: 4 in  no peripheral nerve catheter placed  Assessment  Injection assessment: no difficulty with injection, negative aspiration for heme, no paresthesia on injection and incremental injection

## 2021-06-03 NOTE — ANESTHESIA PROCEDURE NOTES
Spinal Block    Patient location during procedure: OR  Start time: 11/18/2019 11:41 AM  End time: 11/18/2019 11:45 AM  Reason for block: primary anesthetic    Staffing:  Performing  Anesthesiologist: Mahad Melendez MD    Preanesthetic Checklist  Completed: patient identified, risks, benefits, and alternatives discussed, timeout performed, consent obtained, airway assessed, oxygen available, suction available, emergency drugs available and hand hygiene performed  Spinal Block  Patient position: sitting  Prep: ChloraPrep  Patient monitoring: heart rate, cardiac monitor, continuous pulse ox and blood pressure  Approach: midline  Location: L3-4  Injection technique: single-shot  Needle type: pencil-tip   Needle gauge: 24 G

## 2021-06-03 NOTE — ANESTHESIA POSTPROCEDURE EVALUATION
Patient: Megan Burrell  LEFT REVISION TOTAL KNEE ARTHROPLASTY  Anesthesia type: spinal    Patient location: PACU  Last vitals:   Vitals Value Taken Time   /88 11/18/2019  3:40 PM   Temp 36.7  C (98  F) 11/18/2019  3:40 PM   Pulse 103 11/18/2019  3:47 PM   Resp 23 11/18/2019  3:42 PM   SpO2 99 % 11/18/2019  3:47 PM   Vitals shown include unvalidated device data.  Post vital signs: stable  Level of consciousness: awake and responds to simple questions  Post-anesthesia pain: pain controlled  Post-anesthesia nausea and vomiting: no  Pulmonary: unassisted, return to baseline, nasal cannula  Cardiovascular: stable and blood pressure at baseline  Hydration: adequate  Anesthetic events: no    QCDR Measures:  ASA# 11 - Julee-op Cardiac Arrest: ASA11B - Patient did NOT experience unanticipated cardiac arrest  ASA# 12 - Julee-op Mortality Rate: ASA12B - Patient did NOT die  ASA# 13 - PACU Re-Intubation Rate: ASA13B - Patient did NOT require a new airway mgmt  ASA# 10 - Composite Anes Safety: ASA10A - No serious adverse event    Additional Notes:

## 2021-06-03 NOTE — ANESTHESIA CARE TRANSFER NOTE
Last vitals:   Vitals:    11/18/19 1440   BP: (!) 164/95   Pulse: (!) 101   Resp: 14   Temp: 37.2  C (98.9  F)   SpO2: 96%     Patient's level of consciousness is drowsy  Spontaneous respirations: yes  Maintains airway independently: yes  Dentition unchanged: yes  Oropharynx: oropharynx clear of all foreign objects    QCDR Measures:  ASA# 20 - Surgical Safety Checklist: WHO surgical safety checklist completed prior to induction    PQRS# 430 - Adult PONV Prevention: 4558F - Pt received => 2 anti-emetic agents (different classes) preop & intraop  ASA# 8 - Peds PONV Prevention: NA - Not pediatric patient, not GA or 2 or more risk factors NOT present  PQRS# 424 - Julee-op Temp Management: 4559F - At least one body temp DOCUMENTED => 35.5C or 95.9F within required timeframe  PQRS# 426 - PACU Transfer Protocol: - Transfer of care checklist used  ASA# 14 - Acute Post-op Pain: ASA14B - Patient did NOT experience pain >= 7 out of 10

## 2021-06-07 NOTE — PROGRESS NOTES
Discharge Summary    Session Type: Patient is presenting for an Individual session.    Dates of service 8/16/17 to 4/15/2020 # Sessions completed: 0 sessions in 2020, 5 sessions in 2019    Diagnosis at Intake  History of Generalized Anxiety, Claustrophobia and Panic Attacks  Chronic pain syndrome  Mood disorder due to medical condition  TBI  R/O Major Depressive Disorder, Severe  R/O PTSD    Diagnosis at Discharge  Generalized Anxiety, Panic Attacks  Major Depression, Severe  Chronic pain syndrome  History of head injury  PTSD chronic    Progress toward goal 1: Decrease average anxiety level from 9 to 5.  Partially Met    Progress toward goal 2: Decrease average depression level from 8 to 5.  Partially Met    Additional goals: Decrease average pain level from 8 to 5.              ? Increase % acceptance of pain from 50 to 80.  Partially Met    Additional comments: Patient attended psychotherapy on a sporadic basis. She struggled with mental health symptoms, as well as medical concerns. She was returning to work around the time of our last visit on 6/12/19. She would likely benefit from additional psychotherapy to further address anxiety, depression, chronic pain and PTSD concerns.     Reason for discharge:Pt dropped out     Prognosis at discharge:Guarded    Recommendations and referrals at discharge: Follow medical care plan. Resume psychotherapy, if interested.         Urmila Salinas      4/15/2020  11:45 AM

## 2021-06-08 NOTE — PROGRESS NOTES
"Subjective:   Megan Burrell is a 51 y.o. female who presents for evaluation of pain. Patient was last seen 12/7/16.      Major issues:  1. Chronic pain syndrome    2. Other chronic pain    3. Lumbar Disc Degeneration    4. Sacroiliitis        CC: Pain  See rooming evaluation    HPI:   Impact of pain treatments:   Analgesia: fair  ADL's: Work: She reports work is going well. Denies any missed wotking  Social: She will be traveling to the [place] with her son for the senior trip.    AE's: none  Aberrant behavior: none    Aggravating factors: unable to identify  Alleviating factors:   Location/Laterality of the pain: low back, buttock, hip pain right  Timing: contant  Quality: daily muscle cramping has been worse, aching, shooting  Severity:3/10    Medication: Patient is taking zofran 4mg TID prn;  clonidine 0.1mg take take 2 po BID; hydroxyzine 50mg qid; alprazolam 0.5mg prn (uses 1/2 of a pill at bedtime as needed); cyclobenzaprine 5mg 1 po q eveing and 2 po q hs; gabapentin 300mg take 1 po breakfast, lunch, dinner and 3 po qhs; trazodone 100mg po q hs.      Additional Problems: Pancreatic pain: She will be seeing her [facility] Dr. Sanches as she has been struggling with increase pain symptoms and nausea. The nausea has been significant. She is about a year out from the     Review of Systems   Musculoskeletal- + pain  Neuro- - cognitive changes, + visceral symptoms  GI/-  + 3-5 stools per day soft and loose   Psych-  - taking medication in a fashion other than prescribed    Objective:     Vitals:    02/06/17 0828   BP: (!) 151/91   Pulse: 83   Resp: 18   Weight: 150 lb (68 kg)   Height: 5' 5\" (1.651 m)   PainSc:   3       Constitutional:  Pleasant and cooperative female who presents alone today.   Psychiatric: Mood and affect are appropriate for the situation, setting and topic of discussion.  Patient does not appear sedated.  Integumentary:  Observed skin WNL  HEENT: EOM's grossly intact.    Chest: Breathing " is non-labored.   Neurological:  Alert and oriented in all spheres including: time, place, person and situation.    Assessment:   Megan Burrell is a 51 y.o. female seen in clinic today for chronic low back pain which has been stable. She has struggled with chronic pancreatitis and intermittent acute pancreatitis flare up's.      She has completely tapered off the opioids. Goring forward we will focus on a non-opioid plan of care. It was noted there were some cognitive issues, and issues with the UDS. The decision was made with the pt to end opioids.    Today we spoke a bit about nutrition and Dr. Almanza class done in partnership with the nutritionists from [employer]. I opened up options for supplements that may offer some benefit. She will bring this to the attention of her GI doc.     Plan:   Plan/NextSteps:     Please note there are 2 Italia's at the [facility]. Today you saw Italia Ramos when communicating any needs please specify the last name. Thank you    Medication:   Medication prescribed none today    Muscle cramping Supplementation:  Magnesium 250mg increase as tolerated. (diarrhea side effect)  Potassium (banana)   Vitamin D3 1000IU - 5000IU     Supplements  Tumeric (curcumin) - decrease inflammation    Interventional: TPI as needed    Consultation: I understand you will be seeing GI    Nutrition: Classes are being offered from the pain perspective.      Health Maintenance: per primary care    Records: Reviewed to assist with preparation for the office visit and are reflected throughout the note.    Follow up: 3 months      Education: Please call Monday-Friday for problems or questions and one of the clinical support staff (CSS) will help to get things figured out. The number is [phone]. Some folks are using [employer] to send and e-mail. Please remember some issues require an office visit.     Reviewed the plan of care, provided justification and answered questions with the  patient.       Patient Arrived @ 0822 for a 0840 appointment.     TT: failed to docuement- 0905  CT: over half spent in education and counseling as outlined in the plan.    Hodan DENISE FNP-BC  [address] [facility] [address]   [phone]  [phone]

## 2021-06-10 NOTE — PROGRESS NOTES
Subjective:   Megan Burrell is a 51 y.o. female who presents for evaluation of pain. Patient was last seen 2/6/17.      Major issues:  1. Chronic pain syndrome    2. Chronic pancreatitis, unspecified pancreatitis type        CC: Pain  See rooming evaluation    HPI:   Impact of pain treatments:   Analgesia: poor  ADL's: Work: She is working part-time. Household: She reports things have been very challenging.   Aggravating factors: eating  Alleviating factors: she was placed on the oxycodone by the Dr. Cabrera  Associated symptoms: she us using ensure, water and supplements  Location/Laterality of the pain: upper abdominal pain  Timing: constant  Quality: aching  Severity:2/10    Activities Impaired by Increasing Pain Severity: F= 7  3-Enjoy  4-Work, Enjoy  5-Active, Mood Work Enjoy  6-Sleep, Active, Mood Work Enjoy  7-Walk, Sleep, Active, Mood Work Enjoy  8-Relate, Walk, Sleep, Active, Mood Work Enjoy      Medication: Patient is taking zofran 4mg TID prn;  clonidine 0.1mg take 1 po BID; hydroxyzine 50mg qid; alprazolam 0.5mg prn (generally uses 1/2 of a pill at bedtime as needed); cyclobenzaprine 5mg 1 po q eveing and 2 po q hs; gabapentin 300mg take 1 po breakfast, lunch, dinner and 3 po qhs; trazodone 100mg po q hs. Oxycodone 10mg BID (Dr. Cabrera)     Supplements:   Muscle cramping Supplementation:  Magnesium 250mg increase as tolerated. (diarrhea side effect)  Potassium (banana)   Vitamin D3 1000IU - 5000IU      Supplements  Tumeric (curcumin) - decrease inflammation     Interventional: TPI as needed    Additional Problems: Pancreatic pain: She will be seeing her [facility] Dr. Sanches as she has been struggling with increase pain symptoms and nausea. Dr. Walters for the pancreatitis. Recent [facility] for the [facility]itis. She is going in tomorrow for the ERCP. She is concerned about the outcome. She reports she was on vacation again when this happened. She indicates she needed to fly back.     Review  "of Systems   Constitutional- + sleep disturbances  Musculoskeletal- + pain  Neuro- - cognitive changes  GI/-   + visceral, +nausea, + vomiting  Psych- + discouraged      Objective:     Vitals:    04/24/17 0832   BP: 128/87   Pulse: 73   Resp: 16   Weight: 160 lb (72.6 kg)   Height: 5' 5\" (1.651 m)   PainSc:   2       Constitutional:  Pleasant and cooperative female who presents alone today.   Psychiatric: Mood and affect are appropriate for the situation, setting and topic of discussion.  Patient does not appear sedated.  Integumentary:  Observed skin WNL  HEENT: EOM's grossly intact.    Chest: Breathing is non-labored.   Neurological:  Alert and oriented in all spheres including: time, place, person and situation.  Assessment:   Megan Burrell is a 51 y.o. female seen in clinic today for chronic low back pain which has been stable. She has struggled with chronic pancreatitis and intermittent acute pancreatitis flare up's.       She has completely tapered off the opioids. Goring forward we will focus on a non-opioid plan of care. It was noted there were some cognitive issues, and issues with the UDS. The decision was made with the pt to end opioids.     Discussed a celiac plexus block as an option for acute pancreatic pain flare-ups      Plan:   Plan/NextSteps:     Please note there are 2 Italia's at the [facility]. Today you saw Italia Rachel when communicating any needs please specify the last name. Thank you    Medication:   Medication prescribed today none the gabapentin has been sent.    Interventional: We discussed there is an option to consider a celiac plexus block for acute pancreatic flare-ups. Talk with your doctors if all in agreement we can keep that as part of your plan of care    Consultation: I understand you are working with GI and the pancreas specialist     Health Maintenance: per primary care    Records: Reviewed to assist with preparation for the office visit and are reflected " throughout the note.    Follow up: 2 months      Education: Please call Monday-Friday for problems or questions and one of the clinical support staff (CSS) will hel p to get things figured out. The number is [phone]. Some folks are using [employer] to send and e-mail. Please remember some issues require an office visit.     Reviewed the plan of care, provided justification and answered questions with the patient.       *Universal Precautions: non opioid plan of care      Patient Arrived @ 0817 for a 0820 appointment.     TT:2724 - 0986  CT: over half spent in education and counseling as outlined in the plan.      Hodan Ramos APRN FNP-BC  [address] [facility] [address]   [phone]  [phone]

## 2021-06-11 NOTE — PROGRESS NOTES
D) Patient called and left message stating she would like to return to psychotherapy. She states when she started working again, she could not make the time to continue psychotherapy visits. She states she is struggling with mental health needs at this time and she called scheduling, but was informed this therapist was closed to new patients. Informed scheduling that patient could return and will need to complete the adult intake questionnaire prior to scheduling her appointment.

## 2021-06-11 NOTE — PROGRESS NOTES
Subjective:   Megan Burrell is a 51 y.o. female who presents for evaluation of pain. See rooming evaluation. Patient was last seen 4/24/17.     CC: Pain. Review of current status. Patient general opinion of symptoms management and function is poor    Major issues:  1. Chronic pain syndrome    2. Cervical Disc Degeneration    3. Lumbar Disc Degeneration    4. Sacroiliitis    5. Pancreatitis          Patient Active Problem List   Diagnosis     Crohn's Disease     Arthritis     Sacroiliitis     Cervical Disc Degeneration     Lumbar Disc Degeneration     Hypertension     Esophageal Reflux     Duodenal Ulcer     Somatic Dysfunction Of Costochondral Region           HPI: She feels she does not know how to manage the pain symptoms. She reports it is all over.   Impact of pain treatments:   Analgesia: poor  ADL's: Work: She is working but not full time. Household: She is not getting things done at home. She reports she is in bed more than she is out of bed. She is getting up to go to work only.    Aggravating factors: procedures, eating  Alleviating factors: not identified a lot. She feels current treatment offers incomplete impact  Location/Laterality of the pain: all over. Abdominal  Timing: constant  Quality: burning, aching,  Severity:3./10    Activities Impaired by Increasing Pain Severity: F= 7  3-Enjoy  4-Work, Enjoy  5-Active, Mood Work Enjoy  6-Sleep, Active, Mood Work Enjoy  7-Walk, Sleep, Active, Mood Work Enjoy  8-Relate, Walk, Sleep, Active, Mood Work Enjoy      Medication:   Last opioid dose was Oxycodone 10 mg taken on 06/26/17 .    Current Outpatient Prescriptions:      ALPRAZolam (XANAX) 0.5 MG tablet, , Disp: , Rfl: take 1 po q hs     amLODIPine (NORVASC) 10 MG tablet, , Disp: , Rfl:      aspirin (ASPIRIN LOW DOSE) 81 MG EC tablet, Take 81 mg by mouth daily., Disp: , Rfl:      cloNIDine HCl (CATAPRES) 0.1 MG tablet, Take 2 (two) times a day. , Disp: , Rfl:      cyclobenzaprine (FLEXERIL) 5 MG tablet, TAKE  "ONE TABLET BY MOUTH EVERY EVENING AND 2 TABLETS BY MOUTH AT BEDTIME., Disp: 270 tablet, Rfl: 0     famotidine (PEPCID) 20 MG tablet, Take 20 mg by mouth 2 (two) times a day., Disp: , Rfl:      gabapentin (NEURONTIN) 300 MG capsule, TAKE 1 CAPSULE BY MOUTH WITH BREAKFAST, 1 WITH LUNCH, 1 WITH DINNER, AND 3 CAPSULES AT BEDTIME, Disp: 540 capsule, Rfl: 0     hydrOXYzine (VISTARIL) 50 MG capsule, , Disp: , Rfl: anxiety and nausea     losartan (COZAAR) 100 MG tablet, Take 100 mg by mouth daily., Disp: , Rfl:      metoprolol (LOPRESSOR) 100 mg take 1 po qd     ondansetron (ZOFRAN-ODT) 4 MG disintegrating tablet, , Disp: , Rfl:      traZODone (DESYREL) 100 MG tablet, Take 100 mg by mouth bedtime as needed for sleep., Disp: , Rfl:       Interventional: 3 ERCP since her last visit. She reports her physician feels they are working. She indicates she feels worse after each ERCP. She has been medicated after the procedures with oxycodone and this has offered some assistance.     They are not quite ready for her to have the celiac plexus block.     Mental Health: She is feeling discouraged    Review of Systems  Constitutional- + activity intolerance  Musculoskeletal- + pain  Neuro- - cognitive changes, + central  GI:- visceral  Psych- + mood concerns    Social  No family history on file.  History   Smoking Status     Former Smoker   Smokeless Tobacco     Not on file     Comment: On the edwardo-controlhailer     History   Alcohol use Not on file     History   Drug Use Not on file     History   Sexual Activity     Sexual activity: Not on file       Objective:     Vitals:    06/28/17 0830   BP: (!) 139/96   Pulse: 78   Resp: 16   Weight: 155 lb (70.3 kg)   Height: 5' 5\" (1.651 m)   PainSc:   3       Constitutional:  Pleasant and cooperative female who presents alone today.   Psychiatric: Mood and affect are appropriate for the situation, setting and topic of discussion.  Patient does not appear sedated.  Integumentary:  Observed skin " WNL  HEENT: EOM's grossly intact.    Chest: Breathing is non-labored.   Neurological:  Alert and oriented in all spheres including: time, place, person and situation.    Diagnostics:   Imaging:  No new imaging available to review    :  Dated 6/28/2017    Assessment:   Megan Burrell is a 51 y.o. female seen in clinic today for chronic low back pain which has been stable. She has struggled with chronic pancreatitis and intermittent acute pancreatitis flare up's.       She has completely tapered off the opioids. Continuing forward the focus will remain on a non-opioid plan of care. It was noted there were some cognitive issues, and issues with the UDS. The decision was made with the pt to end opioids.      Discussed a celiac plexus block as an option for acute pancreatic pain flare-ups was had at the last visit. She indicates the GI doc has been doing more recently and did not endorse this approach to care at this time    She is interested in working with mental health. We also discussed working with acupuncture    Plan:   Plan/NextSteps:     Follow up: 2-3 months      Education:   Please note there are 2 Italia's at the [facility]. Today you saw Italia Ramos when communicating any needs please specify the last name. Thank you    Please call Monday-Friday for problems or questions and one of the clinical support staff (CSS) will help to get things figured out. The number is [phone]. Some folks are using [employer] to send an e-mail ([employer] message). Please remember some issues require an office visit.     Today we reviewed the plan of care and answered questions.      Health Maintenance: Please continue to see primary care for general medical prevention and illness care.    Mental Health: Please schedule with Sameer or Tomasa.     Interventional: We discussed there is an option to consider a celiac plexus block for acute pancreatic flare-ups. Talk with your doctors if all in agreement we can keep that  as part of your plan of care    Integrative Approaches: Please communicate with your insurance to see if they cover the acupuncture and if they need any order. You can ask who is a participating provider    [facility] at [phone]     [facility] [phone]  [address] [place], [place]  Sliding scale $15-$40    [facility]  Kathy Voss L.Ac, [place] Ac/[facility]  [address], [address] [facility] [address]  Phone: [phone]  Website: [email or web address]    Records: Reviewed to assist with preparation for the office visit and are reflected throughout the note.    Medication:   REFILL INSTRUCTIONS:  Please contact the clinic refill line 7 days before your refill is due. Speak clearly; note cell phones cut in-and-out and poor quality speech and reception issues will influence our ability to hear you and be efficient with your prescription.     Call [phone] leave:   Your name (first and last w/ spelling)   Date of birth  Name of all the medication(s) being requested  Dose of the medication(s)   How you are taking the medication (eg. twice per day etc).     Contact your pharmacy 3 (three) days after leaving your message to see if your prescription has been received. Please request the pharmacy check your profile to be certain about any concerns with a script failing to be received. Note: Monet updates have been inconsistent.  If the script has not been received there may have been a problem with the communication please reach back out to the clinic.     Patient Arrived @ 0825for a 0820 appointment.     TT: 0827 - 0176  CT: over half spent in education and counseling as outlined in the plan.      Hodan Ramos APRN FNP-BC  [address] [facility] [address]   [phone]  [phone]

## 2021-06-12 NOTE — PROGRESS NOTES
"11/4/2020    Start time: 10:00 AM    Stop Time: 10:48 AM   Session # 2      Megan Burrell is a 55 y.o. female who is being evaluated via a billable telephone visit.      Major depression, severe, recurrent  Generalized Anxiety Disorder  Panic Attacks  Chronic Pain Syndrome  Chronic PTSD    The patient has been notified of following:     \"This telephone visit will be conducted via a call between you and your provider. This service lets us provide the care you need with a phone conversation.      If during the course of the call the provider feels a telephone visit is not appropriate, you will not be charged for this service.\"     New symptoms or complaints: None    Functional Impairment:   Personal: 4  Family: 4  Work: 4  Social:4    Clinical assessment of mental status: Megan Burrell presented on time.  She was open and cooperative. Her memory was in tact, but she reports difficulty in daily living .  Her  speech was appropriate.  Language was appropriate.  Concentration and focus is brief. Psychosis is not noted or reported. She reports her mood is depressed and anxious .  Affect is congruent with speech.  Fund of knowledge is adequate. Insight is adequate for therapy.     Suicidal/Homicidal Ideation present: None Reported This Session    Patient's impression of their current status: Patient reports ongoing stressors related to her 's health issues and behaviors related to his health issues. She identifies isolating and that she continues to struggle with depression and anxiety/panic symptoms. Patient reports she feels distracted and is not sleeping well.        Therapist impression of patients current state: Patient seems to be struggling with stressors related to her 's health issues and behavior. She reports this has been stressful and it is difficult for her to sleep or concentrate. Discussed creating a safe environment at home.. Although she denies that any threats to her safety have been " "stated, she identifies a potential risk due to her 's illness. Also discussed ways to relay concerns to his medical providers and she states she has started to do this. Discussed ways to self care and reach out to support when needed. She does have her teenaged nephew staying with her at  times and her adult children, as well as other family, have been supportive. Patient does report difficulty with motivation and interest. She continues to work as she is able 4 days per week and is on FMLA. Patient reports panic attacks continue. In session she appeared distracted at times and would lose train of thought. She would state \"I don't even know what we just talked about\". Encouraged her to attend weekly and to make an effort to come in the office for sessions, if she is able. She seems to feel overwhelmed right now with her stressors and changes in her family. Presented grounding exercise and patient seemed to benefit. She reported plan to take a walk after work to help de-stress.    Type of psychotherapeutic technique provided: Client centered, CBT and Mindfulness    Progress toward short term goals: Patient is working on increasing coping skills to manage symptoms of mental illness and chronic pain.     Review of long term goals: Not done at today's visit. Paperwork was completed with intake on 10/22/2020. Will review/sign next time, as time did not permit today.     Diagnosis: Major depression, severe, recurrent  Generalized Anxiety Disorder  Panic Attacks  Chronic Pain Syndrome  Chronic PTSD    Plan and Follow up: Follow pain center plan of care. Attend sessions on a weekly basis. Attend in person, if able, otherwise, patient can attend via video or telephone at this time, due to COVID 19 guidelines. Practice relaxation skills as discussed. Continue to work on creating a safe environment at home.          Discharge Criteria/Planning: Patient will continue with follow-up until therapy can be discontinued " without return of signs and symptoms.    Urmila Salinas 11/4/2020
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
## 2021-06-12 NOTE — PROGRESS NOTES
10/26/2020    Start time: 10:10 PM    Stop Time: 11:00 AM   Session # 1    Megan Burrell is a 55 y.o. female is being seen today for    Chief Complaint   Patient presents with     Mental Health Note   . Major depression, severe, recurrent  Generalized Anxiety Disorder  Panic Attacks  Chronic Pain Syndrome  Chronic PTSD    New symptoms or complaints: None    Functional Impairment:   Personal: 4  Family: 4  Work: 4  Social:4    Clinical assessment of mental status: Megan Burrell presented on time.  She was open and cooperative, and dressed appropriately for this session and weather. Her memory was in tact .  Her  speech was appropriate.  Language was appropriate.  Concentration and focus is brief and was redirected at times. Psychosis is not noted or reported. She reports her mood is depressed and anxious .  Affect is congruent with speech.  Fund of knowledge is adequate. Insight is adequate for therapy.     Suicidal/Homicidal Ideation present: None Reported This Session    Patient's impression of their current status: Patient reports ongoing stressors related to her 's health issues and behaviors related to his health issues. She identifies isolating and that she continues to struggle with depression and anxiety symptoms.     Therapist impression of patients current state: Patient seems to be struggling with stressors related to her 's health issues and behavior. She reports his personality has changed due to his illness and this seems to have put considerable strain on her. Discussed creating a safe environment at home, including locking up or disposing of sharps or guns. Although she denies that any threats to her safety have been stated, she identifies a potential risk due to her 's illness. Also discussed ways to relay concerns at her 's next medical visits. Discussed ways to self care and reach out to support when needed. She does have her teenaged nephew staying with her at this  "time and her adult children, as well as other family, have been supportive. Patient does report difficulty with motivation and interest. She continues to work as she is able and is on FMLA. Patient reports panic attacks continue. In session she appeared distracted at times and would lose train of thought. Encouraged her to attend weekly and to make an effort to come in the office for sessions, if she is able. She seems to feel overwhelmed right now with her stressors and changes in her family. She states \"I lost my  6 months ago\",  which was the time he started to exhibit changes in behavior. Completed some of the adult intake questionnaire (see chart). With further assessment,  there were no concerns with substance use issues or suicidal ideation, therefore, PANSI, SOAPP-R and CAGE were not completed at this time.     Type of psychotherapeutic technique provided: Client centered, CBT and Mindfulness    Progress toward short term goals: Discussed patient's goals and treatment plan will be developed.     Review of long term goals: Discussed patient's goals and treatment plan will be developed.     Diagnosis: Major depression, severe, recurrent  Generalized Anxiety Disorder  Panic Attacks  Chronic Pain Syndrome  Chronic PTSD    Plan and Follow up: Follow pain center plan of care. Attend sessions on a weekly basis. Attend in person, if able, otherwise, patient can attend via video or telephone at this time, due to COVID 19 guidelines. Practice relaxation skills as discussed. Continue to work on creating a safe environment at home.       Discharge Criteria/Planning: Patient will continue with follow-up until therapy can be discontinued without return of signs and symptoms.    Urmila Salinas 10/26/2020      "

## 2021-06-12 NOTE — PROGRESS NOTES
D) Patient called and left message that she received paperwork to complete prior to her appointment to resume psychotherapy. She stated in the message that she is really struggling and that her  is dying and being treated for cancer. She requested a call back to check in due to her symptom concerns. Communicated with scheduling to schedule patient first available and that I will reach out to her today or tomorrow by phone.

## 2021-06-12 NOTE — PROGRESS NOTES
8/16/2017    Start time: 9:00 AM    Stop Time: 9:50 AM   Session # 1    Megan Burrell is a 51 y.o. female is being seen today for    Chief Complaint   Patient presents with     Mental Health Note   . History of Generalized Anxiety, Claustrophobia and Panic Attacks  Chronic pain syndrome  Mood disorder due to medical condition  TBI  R/O Major Depressive Disorder, Severe  R/O PTSD      New symptoms or complaints: None    Functional Impairment:   Personal: 4  Family: 4  Work: 4  Social:4    Clinical assessment of mental status: Megan Burrell presented on time.  She was open and cooperative, and dressed appropriately for this session and weather. Her memory was in tact, but she reports short term memory impairment .  Her  speech was appropriate.  Language was appropriate.  Concentration and focus is on task, but she reports this is impaired in daily living. Psychosis is not noted or reported. She reports her mood is depressed and anxious .  Affect is congruent with speech.  Fund of knowledge is adequate. Insight is adequate for therapy.     Suicidal/Homicidal Ideation present: None Reported This Session    Patient's impression of their current status: Patient reported she is struggling with anxiety this date and had symptoms of panic on the way to her appointment. She acknowledged that her anxiety is triggered by her chronic pain symptoms. Patient engaged in personal values discussion. Discussed her TBI and how this impacts her in daily living. Patient identified some healthy coping skills that help manage symptoms in daily living. She is working on increasing exercise, but notices her strength is declining. She discussed stressors at work and at home. She also discussed thoughts and feelings about losses that resulted from her pain condition. She discussed interest in pursuing the medical cannabis program.    Therapist impression of patients current state: Patient appears to be struggling with her mental health and  chronic pain symptoms. She reports engaging in some healthy coping skills, however, she reports continued concern. Patient was praised for her efforts. Patient seemed open to discussion on personal values and she seemed to gain insight. It appears symptoms of anxiety are difficult to manage and stressors at work and home seem to be triggers. Discussed EMDR therapy and how this may be of benefit. Will begin resourcing in future sessions. Briefly discussed her interest in the medical cannabis program and will bring this to her medical provider's attention. Will continue to assess to R/O PTSD and/or Major Depression.     Type of psychotherapeutic technique provided: Client centered, CBT and Mindfulness    Progress toward short term goals: Patient reports following her pain center plan of care. Patient reports working on implementing coping skills to manage mental health and chronic pain symptoms. She reports engaging in leisure activities as she is able. She reports continued employment. Discussed patient's goals and treatment plan will be developed.     Review of long term goals: Discussed patient's goals and treatment plan will be developed.     Diagnosis: History of Generalized Anxiety, Claustrophobia and Panic Attacks  Chronic pain syndrome  Mood disorder due to medical condition  TBI  R/O Major Depressive Disorder, Severe  R/O PTSD    Plan and Follow up: Continue psychotherapy sessions every 2 weeks to further address chronic pain, depression and anxiety symptoms. Consider EMDR therapy and will begin resourcing. Implement healthy coping skills to manage symptoms. Follow pain center plan of care.       Discharge Criteria/Planning: Patient will continue with follow-up until therapy can be discontinued without return of signs and symptoms.    Urmila Salinas 8/16/2017

## 2021-06-12 NOTE — PROGRESS NOTES
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 worsening after the traumatic event(s), beginning or worsening after the traumatic event(s) occurred as evidence by two (or more) of the followin. Irritable behavior and angry outburst (with little or no provocation) typically expressed as verbal or physical aggression toward people or objects.   2. Reckless or self-destructive behavior   3. Hypervigilance   4. Exaggerated startled response   5. Problems with concentration   6. Sleep disturbances (e.g. difficulty falling or staying a sleep or restless sleep).   Criteria F: Duration of the disturbance (Criteria B, C, D, and E) is more than 1 month.   Criteria G: The disturbance causes clinically significant distress or impairment in social, occupational or other important areas of functioning.   Criteria H: The disturbance is not attributable to the physiological effects of a substance (e.g., medication, alcohol) or another medical condition).   Specify whether:   With dissociative symptoms: The individual s symptoms meet the criteria for PTSD, and in addition, in response to the stressor, the individual experiences persistent or recurrent symptoms of either of the followin. Depersonalization: Persistent or recurrent experiences of feeling detached from, and as if one were an outside observer of,  one s mental processes or body (e.g., feeling as though one were in a dream; feeling a sense of unreality of self or body or of time moving slowly).   2. Derealization: Persistent or recurrent experience of unreality of surroundings (e.g., the world around the individual is experienced as unreal dreamlike, distant, or distorted).   3.   NOTE: to use this subtype, the dissociative symptoms must not be attributable to the physiological effect of substance (e.g., blackouts, behavior during alcohol intoxication) or another medical condition (e.g., complex partial seizure).   Specify if:   With delayed expression: If full diagnostic criteria are not met until at least 6 months after the event (although the onset and expression of some of the symptoms may be immediate).

## 2021-06-12 NOTE — PROGRESS NOTES
Outpatient Mental Health Treatment Plan    Name:  Megan Burrell  :  1965  MRN:  [id number]    Treatment Plan:  Initial Treatment Plan  Date Treatment Plan Initiated:  17    Necessity for treatment: To address cognitive, behavioral, and/or emotional barriers in order to work toward goals and to improve quality of life.    Plan:           ?   ? Anxiety    Goal:  Decrease average anxiety level from 9 to 5.   Strategies: ? [x]Learn and practice relaxation techniques and other coping        strategies (e.g., thought stopping, reframing, meditation)     ? [x] Increase involvement in meaningful activities     ? [x] Discuss sleep hygiene     ? [x] Explore thoughts and expectations about self and others     ? [x] Identify and monitor triggers for panic/anxiety symptoms     ? [x] Implement physical activity routine (with physician approval)     ? [x] Consider introduction of bibliotherapy and/or videos     ? [x] Continue compliance with medical treatment plan (or explore          barriers)                                       [x]           Degree to which this is a problem (1-4): 4   Degree to which goal is met (1-4)0  Date of Review:17  Adjustment to disability / changes    Goal:  Increase % acceptance from 50 to 80.   Strategies: ?[x]  Explore thoughts and expectations about self and others   [x] Explore emotional reactions to illness/injury     ?[x] Learn and practice relaxation techniques and other coping        strategies     [x] Implement physical activity routine (with physician approval)                [x] Increase involvement in meaningful activities                [x] Engage in values clarification and goal-setting     [x] Consider introduction of bibliotherapy and/or videos                [x] Explore barriers to cooperation with rehabilitation program   Degree to which this is a problem (1-4): 4   Degree to which goal is met (1-4)0  Date of Review:  Chronic pain  Goal:  ? Decrease average pain  "level from 8 to 5.   ? Increase % acceptance of pain from 50 to 80.   Strategies: ? [x] Explore thoughts and expectations about self and others    [x] Explore emotional reactions to illness/injury      ? [x] Learn and practice relaxation techniques and other coping          strategies            [x] Implement physical activity routine (with physician approval)     ? [x] Engage in values clarification and goal-setting     ? [x] Consider introduction of bibliotherapy and/or videos     ? [x] Increase involvement in meaningful activities     ? [x] Discuss sleep hygiene     ? [x] Process grief (loss of significant person, independence, role,          etc.)     ? [x] Assess for suicide risk     ?    Degree to which this is a problem (1-4): 4   Degree to which goal is met (1-4)0  Date of Review    Functional Impairment: 1=Not at all/Rarely  2=Some days  3=Most Days  4=Every Day Personal: 4  Family: 4  Social: 4  Work: 4    Multiaxial Diagnosis:  History of Generalized Anxiety, Claustrophobia and Panic Attacks  Chronic pain syndrome  Mood disorder due to medical condition  TBI  R/O Major Depressive Disorder, Severe  R/O PTSD    Anticipated intensity of services:  Every 2 weeks    Estimated duration of treatment:  20 sessions  Strengths:  Devine, reliable   Limitations:  \"I want to get back to who I was\".   Cultural Considerations: Patient is a 51 year old, ,  female, who has a history of anxiety, panic attacks, depression, trauma, TBI and chronic pain concerns.     Persons responsible for this plan:  ? [x] Patient ? [x] Provider ? [] Other: __________________      Provider: Urmila Salinas  Date:  8/16/17  Time:  11:43 AM        Psychologist Signature           Patient Signature:              Guardian Signature             "

## 2021-06-12 NOTE — PROGRESS NOTES
Outpatient Mental Health Treatment Plan    Name:  Megan Burrell  :  1965  MRN:  [id number]    Treatment Plan:  Initial Treatment Plan  Intake/initial treatment plan date:  10/22/2020  Benefit and risks and alternatives have been discussed: Yes  Is this treatment appropriate with minimal intrusion/restrictions: Yes  Estimated duration of treatment:  Approximately 20 sessions.  Anticipated frequency of services:  Every 1 weeks  Necessity for frequency: This frequency is needed to establish therapeutic goals and for continuity of care in order to monitor progress.  Necessity for treatment: To address cognitive, behavioral, and/or emotional barriers in order to work toward goals and to improve quality of life.    Session Type: Patient is presenting for an Individual session.    Plan:           ?   ? Anxiety    Goal:  Decrease average anxiety level from 8 to 5.   Strategies: ? [x]Learn and practice relaxation techniques and other coping        strategies (e.g., thought stopping, reframing, meditation)     ? [x] Increase involvement in meaningful activities     ? [x] Discuss sleep hygiene     ? [x] Explore thoughts and expectations about self and others     ? [x] Identify and monitor triggers for panic/anxiety symptoms     ? [x] Implement physical activity routine (with physician approval)     ? [x] Consider introduction of bibliotherapy and/or videos     ? [x] Continue compliance with medical treatment plan (or explore          barriers)                                       [x]     ?Degree to which this is a problem: 4  Degree to which goal is met: 0  Date of Review: 10/26/2020     ? Depression    Goal:  Decrease average depression level from 8 to 5.   Strategies:    ?[x] Decrease social isolation     [x] Increase involvement in meaningful activities     ?[x] Discuss sleep hygiene     ?[x] Explore thoughts and expectations about self and others     ?[x] Process grief (loss of significant person, independence,  role,        etc.)     ?[x] Assess for suicide risk     ?[x] Implement physical activity routine (with physician approval)     [x] Consider introduction of bibliotherapy and/or videos     [x] Continue compliance with medical treatment plan (or explore         barriers)       ?  Degree to which this is a problem: 4  Degree to which goal is met: 0  Date of Review: 10/26/2020  Adjustment to family changes    Goal:  Increase % acceptance from 50 to 70.   Strategies: ?[x]  Explore thoughts and expectations about self and others   [x] Explore emotional reactions to illness/injury     ?[x] Learn and practice relaxation techniques and other coping        strategies     [x] Implement physical activity routine (with physician approval)                [x] Increase involvement in meaningful activities                [x] Engage in values clarification and goal-setting     [x] Consider introduction of bibliotherapy and/or videos                    Degree to which this is a problem: 4  Degree to which goal is met: 0  Date of Review: 10/26/2020       Functional Impairment:  1=Not at all/Rarely  2=Some days  3=Most Days  4=Every Day    Personal : 4  Family : 4  Social : 4   Work/school : 4    Diagnosis:Major depression, severe, recurrent  Generalized Anxiety Disorder  Panic Attacks  Chronic Pain Syndrome  Chronic PTSD      WHODAS 2.0 12-item version 16.67%     Scores presented in qualifiers to represent level of disability.  MILD Problem (slight, low, ...) 5-24%    H1= 15  H2= 6  H3= 0        Clinical assessments and measures completed:. TREVIN-7 and PHQ-9     Strengths: Sikes, reliable  Limitations:  Difficulty managing anxiety symptoms  Cultural Considerations: Patient is a 55 year old, , , female who has a history of chronic pain, anxiety/panic and depression symptoms.   Persons responsible for this plan: Patient and Provider            Psychotherapist Signature           Patient Signature:              Guardian  Signature             Provider: Performed and documented by Urmila Salinas [facility]   Date:  10/26/2020

## 2021-06-12 NOTE — PROGRESS NOTES
D) Patient left message to call back. Called patient back and talked briefly. She states her anxiety is high and that her  has serious medical issues right now. She reports she would like to get back into therapy to further work on coping skills to manage her anxiety and cope with the current concerns with her 's health issues. She is scheduled for 10/22 and 10/26.

## 2021-06-13 NOTE — PROGRESS NOTES
Subjective:   Megan Burrell is a 52 y.o. female who presents for evaluation of pain. See rooming evaluation. Patient was last seen 6/28/17     CC: Pain. Review of current status. Patient general opinion of symptoms management and function is fair    Major issues:  1. Lumbar Disc Degeneration    2. Chronic pain    3. Sacroiliitis    4. Myalgia          Patient Active Problem List   Diagnosis     Crohn's Disease     Arthritis     Sacroiliitis     Cervical Disc Degeneration     Lumbar Disc Degeneration     Hypertension     Esophageal Reflux     Duodenal Ulcer     Somatic Dysfunction Of Costochondral Region           HPI:   Work: She is working. She took a couple of days off. She had a 10# restriction.  Aggravating factors: sitting to standing  Alleviating factors: medrol dosepak (calming for the )  DME: ice (takes the edge off)  Location/Laterality of the pain: low back and right buttock and into the right hip  Timing: constant  Quality: aching, sitting, standing  Severity:3/10    Activities Impaired by Increasing Pain Severity: F= 7  3-Enjoy  4-Work, Enjoy  5-Active, Mood Work Enjoy  6-Sleep, Active, Mood Work Enjoy  7-Walk, Sleep, Active, Mood Work Enjoy  8-Relate, Walk, Sleep, Active, Mood Work Enjoy      Medication:     Current Outpatient Prescriptions:      ALPRAZolam (XANAX) 0.5 MG tablet, , Disp: , Rfl:      amLODIPine (NORVASC) 10 MG tablet, , Disp: , Rfl:      aspirin (ASPIRIN LOW DOSE) 81 MG EC tablet, Take 81 mg by mouth daily., Disp: , Rfl:      cloNIDine HCl (CATAPRES) 0.1 MG tablet, Take 0.2 mg by mouth 2 (two) times a day. , Disp: , Rfl:      cyclobenzaprine (FLEXERIL) 5 MG tablet, TAKE ONE TABLET BY MOUTH EVERY EVENING AND 2 TABLETS BY MOUTH AT BEDTIME., Disp: 270 tablet, Rfl: 0     famotidine (PEPCID) 20 MG tablet, Take 20 mg by mouth 2 (two) times a day., Disp: , Rfl:      gabapentin (NEURONTIN) 300 MG capsule, TAKE 1 CAPSULE BY MOUTH WITH BREAKFAST, 1 WITH LUNCH, 1 WITH DINNER, AND 3 CAPSULES AT  "BEDTIME, Disp: 540 capsule, Rfl: 0     hydrOXYzine (VISTARIL) 50 MG capsule, , Disp: , Rfl:      losartan (COZAAR) 100 MG tablet, Take 100 mg by mouth daily., Disp: , Rfl:      metoprolol (LOPRESSOR) 25 MG tablet, Take 1 tablet by mouth 2 (two) times a day., Disp: , Rfl:      ondansetron (ZOFRAN-ODT) 4 MG disintegrating tablet, , Disp: , Rfl:      traZODone (DESYREL) 100 MG tablet, Take 100 mg by mouth bedtime as needed for sleep., Disp: , Rfl:      Interventional: She is interested in an injection    Rehabilitation: She is continuing the [facility]    Mental Health: She has bob working with [facility]      Review of Systems   Constitutional- + activity intolerance  Musculoskeletal- +pain, + swelling  Neuro- - cognitive changes, - radicular  GI: - constipation  :  - urinary difficulty  Psych-  + mood concerns    Social  No family history on file.  History   Smoking Status     Former Smoker   Smokeless Tobacco     Not on file     Comment: On the edwardo-controlhailer     History   Alcohol use Not on file     History   Drug Use Not on file     History   Sexual Activity     Sexual activity: Not on file       Objective:     Vitals:    10/13/17 0904   BP: 135/90   Pulse: 72   Resp: 16   Weight: 155 lb (70.3 kg)   Height: 5' 5\" (1.651 m)   PainSc:   3       Constitutional:  Pleasant and cooperative female who presents alone today.   Psychiatric: Mood and affect are appropriate for the situation, setting and topic of discussion.  Patient does not appear sedated.  Integumentary:  Observed skin WNL  HEENT: EOM's grossly intact.    Chest: Breathing is non-labored.   Neurological:  Alert and oriented in all spheres including: time, place, person and situation.  Pelvis:  Tenderness + with palpation of the greater trochanteric region  right. Tenderness +  with palpation over the SI joint right. Tension and tenderness are noted with palpation of the gluteus gil, medius and minimus on the right. Tenderness +  along the right coccyx.     : "  Dated 10/13/2017    Assessment:   Megan Burrell is a 52 y.o. female seen in clinic today for chronic low back pain which has been stable. She has struggled with chronic pancreatitis and intermittent acute pancreatitis flare up's.       She has completely tapered off the opioids. Continuing forward the focus will remain on a non-opioid plan of care. It was noted there were some cognitive issues, and issues with the UDS. The decision was made with the pt to end opioids.      Discussed a celiac plexus block as an option for acute pancreatic pain flare-ups was had at the last visit. She indicates the GI doc has been doing more recently and did not endorse this approach to care at this time     She is interested in working with mental health.     Acute inflammatory concern with the right SIJ and the buttock musculature.       Plan:   Plan/NextSteps:     Follow up: 2 months    Education:   Please note there are 2 Italia's at the [facility]. Today you saw Italia Molinagins when communicating any needs please specify the last name. Thank you    Please call Monday-Friday for problems or questions and one of the clinical support staff (CSS) will help to get things figured out. The number is [phone]. Some folks are using [employer] to send an e-mail ([employer] message). Please remember some issues require an office visit.     Today we reviewed the plan of care and answered questions.      Health Maintenance: Please continue to see primary care for general medical prevention and illness care.    Mental Health: Continue with [facility]er    Rehabilitation: Continue with your stretching and ice    Interventional: I would start with TPI into the buttock muscles on he right. You can consider a SIJ injection    Due to concerns about blunting of the immune response it is recommended there be a two week berth between the flu vaccine and injections. Flu shot on 10/10    Integrative Approaches: I think a massage may offer some  assistance    I can offer Acupuncture if interested I can place an order. Let me know    Records: Reviewed to assist with preparation for the office visit and are reflected throughout the note.    Medication:   You will continue the gabapentin and the cyclobenzaprine.        Patient Arrived @ 0836 for a 0900 appointment.     TT: 0920 - 0937  CT: over half spent in education and counseling as outlined in the plan.      Hodan Ramos APRN FNP-BC  [address] [facility] [place] [address]   [phone]  [phone]

## 2021-06-13 NOTE — PROGRESS NOTES
10/4/2017    Start time: 9:00 AM    Stop Time: 10:00 AM   Session # 4    Megan Burrell is a 52 y.o. female is being seen today for    Chief Complaint   Patient presents with     Mental Health Note   . History of Generalized Anxiety, Claustrophobia and Panic Attacks  Chronic pain syndrome  Mood disorder due to medical condition  TBI  R/O Major Depressive Disorder, Severe  R/O PTSD       New symptoms or complaints: None    Functional Impairment:   Personal: 4  Family: 4  Work: 4  Social:4    Clinical assessment of mental status: Megan Burrell presented on time.  She was open and cooperative, and dressed appropriately for this session and weather. Her memory was in tact, but she reports short term memory concerns in daily living.  Her  speech was appropriate.  Language was appropriate.  Concentration and focus is brief at times, and was redirected. Psychosis is not noted or reported. She reports her mood is anxious and depressed .  Affect is congruent with speech.  Fund of knowledge is adequate. Insight is adequate for therapy.     Suicidal/Homicidal Ideation present: None Reported This Session    Patient's impression of their current status: Patient reported less anxiety this session, but that she is experiencing a pain flare up this week. However, she continues to acknowledged that anxiety and depression symptoms are triggered by her chronic pain symptoms and other personal stressors as well. Sleep has been negatively impacted by her pain flare up as well. Patient reports she struggles with motivation at this time and is feeling tired this date due to pain and reaction to medication. Patient engaged in personal values discussion and discussed some things she is looking forward to. Patient identified some healthy coping skills that help manage symptoms in daily living, including implementing meditation and letting go exercises in daily living. She also purchased and brought to session, a stress reliever in the shape  of a frog,  that she finds calming. She identified how this has impacted her senses, distracts her and is able to help with grounding skills. She discussed stressors at work and changes at home. She reports an increase with tobacco use, due to boredom and pain. She denies alcohol use at this time.     Therapist impression of patients current state: Patient appears to be struggling with her mental health and chronic pain symptoms. She seems to be more engaged with using meditation and grounding skills in daily living. It seems she finds benefit. Patient was praised for her efforts and reinforced continued meditation practice. Patient seemed open to discussion on personal values and she seemed to gain insight. It appears symptoms of anxiety are difficult to manage and stressors at work and home seem to be triggers.  Discussed ways she can address motivation concerns in a healthier way. Discussed how using her stress reliever frog has allowed her to be more in the moment. Discussed other calming strategies that appealed to her interests, including listening to ocean sounds and beginning handwork projects. Will continue to assess to R/O Major Depression. Will continue to assess for PTSD, if symptoms change or worsen.     Type of psychotherapeutic technique provided: Client centered, CBT and Mindfulness    Progress toward short term goals: Patient reports following her pain center plan of care. Patient reports working on implementing coping skills, including meditation, to manage mental health and chronic pain symptoms. She reports engaging in leisure activities as she is able, but this has been challenging. She reports continued employment.     Review of long term goals: Not done at today's visit    Diagnosis: History of Generalized Anxiety, Claustrophobia and Panic Attacks  Chronic pain syndrome  Mood disorder due to medical condition  TBI  R/O Major Depressive Disorder, Severe  R/O PTSD     Plan and Follow up:  Continue psychotherapy sessions every 2 weeks to further address chronic pain, depression and anxiety symptoms. Continue EMDR therapy and will continue resourcing. Implement healthy coping skills to manage symptoms. Practice container exercise and letting go exercise, as presented in session, in daily living. Consider meditation prior to bed to help with relaxation. Consider starting a handwork project to reduce boredom and increase focus in the moment. Follow pain center plan of care.       Discharge Criteria/Planning: Patient will continue with follow-up until therapy can be discontinued without return of signs and symptoms.    Urmila Salinas 10/4/2017

## 2021-06-13 NOTE — PROGRESS NOTES
11/1/2017    Start time: 10:50 AM    Stop Time: 11:50 AM   Session # 5    Megan Burrell is a 52 y.o. female is being seen today for    Chief Complaint   Patient presents with     Mental Health Note   .   History of Generalized Anxiety, Claustrophobia and Panic Attacks  Chronic pain syndrome  Mood disorder due to medical condition  TBI  R/O Major Depressive Disorder, Severe  R/O PTSD    New symptoms or complaints: None    Functional Impairment:   Personal: 4  Family: 4  Work: 4  Social:4    Clinical assessment of mental status: Megan Burrell presented on time.  She was open and cooperative, and dressed appropriately for this session and weather. Her memory was in tact, but she reports short term memory concerns .  Her  speech was appropriate.  Language was appropriate.  Concentration and focus is on task. Psychosis is not noted or reported. She reports her mood is depressed and anxious .  Affect is congruent with speech.  Fund of knowledge is adequate. Insight is adequate for therapy.     Suicidal/Homicidal Ideation present: None Reported This Session    Patient's impression of their current status: Patient reported less anxiety this session, but that she has had nausea/stomach distress due to anxiety in daily living. She continues to acknowledge that anxiety and depression symptoms are triggered by her chronic pain symptoms, work stressors and other personal stressors as well. Sleep has been negatively impacted by her pain flare up as well. Patient reports she struggles with motivation, but has started to engage in some small cleaning activities at home. Patient engaged in personal values discussion and discussed some things she is looking forward to. Patient identified some healthy coping skills that help manage symptoms in daily living, including implementing meditation, letting go exercises and other stress relieving tools in daily living. She discussed stressors at work and changes at home. Patient processed  difficulties with setting boundaries with scheduling at work. She talked at length about tobacco use and was able to identify triggers, as well as barriers to quitting. She denies alcohol use at this time. She discussed a recent social security meeting and experiencing feelings of panic. Patient participated in an urge surfing exercise and expressed benefit.     Therapist impression of patients current state: Patient appears to be struggling with her mental health and chronic pain symptoms. She seems to be engaging with using meditation and grounding skills in daily living. It seems she finds benefit. Patient was praised for her efforts and reinforced continued meditation practice. Patient seemed open to discussion on personal values and she seemed to gain insight. It appears symptoms of anxiety are difficult to manage and stressors at work and home seem to be triggers. It appears setting consistent boundaries at work has been a challenge and will be important to further discuss. Discussed ways she can address motivation concerns and also praised her for the efforts she has been making. Patient seems to have a desire to quit smoking, and discussion will continue regarding triggers and barriers. She appears open to acupuncture to help with smoking cessation and pain management. Also suggested hypnosis for smoking cessation, if interested. Will continue to assess to R/O Major Depression. Will continue to assess for PTSD, if symptoms change or worsen. Presented urge surfing exercise and patient appeared to benefit.     Type of psychotherapeutic technique provided: Client centered, CBT and Mindfulness    Progress toward short term goals: Patient reports following her pain center plan of care. Patient reports working on implementing coping skills, including meditation, to manage mental health and chronic pain symptoms. She reports engaging in leisure activities as she is able, but this has been challenging. She reports  continued employment. Continue discussion on smoking cessation.     Review of long term goals: Not done at today's visit    Diagnosis: History of Generalized Anxiety, Claustrophobia and Panic Attacks  Chronic pain syndrome  Mood disorder due to medical condition  TBI  R/O Major Depressive Disorder, Severe  R/O PTSD    Plan and Follow up: Continue psychotherapy sessions every 2 weeks to further address chronic pain, depression and anxiety symptoms. Continue EMDR therapy and will continue resourcing. Implement healthy coping skills to manage symptoms. Practice container exercise and letting go exercise, as presented in session, in daily living. Consider meditation prior to bed to help with relaxation. Consider starting a handwork project to reduce boredom and increase focus in the moment. Continue discussing plan for smoking cessation. Consider acupuncture services. Follow pain center plan of care.       Discharge Criteria/Planning: Patient will continue with follow-up until therapy can be discontinued without return of signs and symptoms.    Urmila Salinas 11/1/2017

## 2021-06-13 NOTE — PROGRESS NOTES
9/15/2017    Start time: 9:00 AM    Stop Time: 9:55 AM   Session # 3    Megan Burrell is a 51 y.o. female is being seen today for    Chief Complaint   Patient presents with     Mental Health Note   . History of Generalized Anxiety, Claustrophobia and Panic Attacks  Chronic pain syndrome  Mood disorder due to medical condition  TBI  R/O Major Depressive Disorder, Severe  R/O PTSD    New symptoms or complaints: None    Functional Impairment:   Personal: 4  Family: 4  Work: 4  Social:4    Clinical assessment of mental status: Megan Burrell presented on time.  She was open and cooperative, and dressed appropriately for this session and weather. Her memory was in tact .  Her  speech was appropriate.  Language was appropriate.  Concentration and focus is mostly on task, but was redirected at times. Psychosis is not noted or reported. She reports her mood is anxious .  Affect is congruent with speech.  Fund of knowledge is adequate. Insight is adequate for therapy.     Suicidal/Homicidal Ideation present: None Reported This Session    Patient's impression of their current status: Patient reported she is struggling with anxiety this date, including nausea and worry, but that her anxiety is less this session. She states traffic was light on the way in, and this helped reduce anxiety.  She acknowledged that her anxiety is triggered by her chronic pain symptoms and other personal stressors as well. Patient reports she struggles with motivation at this time. Patient engaged in personal values discussion and discussed some activities she has planned that she is looking forward to. Patient identified some healthy coping skills that help manage symptoms in daily living. She discussed stressors at work and changes at home. Patient participated in a letting go exercise and container exercise in session. She reported benefit.    Therapist impression of patients current state: Patient appears to be struggling with her mental health  and chronic pain symptoms. She reports engaging in some healthy coping skills, however, she reports this has been difficult. Patient was praised for her efforts. Patient seemed open to discussion on personal values and she seemed to gain insight. It appears symptoms of anxiety are difficult to manage and stressors at work and home seem to be triggers. Continued to discuss EMDR therapy and reinforced engaging in meditation exercises presented in session. Discussed ways she can address motivation concerns in a healthier way. Presented container and letting go exercise in session and patient seemed to be able to focus in the moment. Went over REKHA II score and PCL-5 score from last session. Offered her a stress ball in session, which she seemed to find calming. This seemed to help patient to gain focus over the course of the session. Will continue to assess to R/O Major Depression. Will continue to assess for PTSD, if symptoms change or worsen.     Type of psychotherapeutic technique provided: Client centered, CBT and Mindfulness    Progress toward short term goals: Patient reports following her pain center plan of care. Patient reports working on implementing coping skills to manage mental health and chronic pain symptoms. She reports engaging in leisure activities as she is able, but this has been challenging. She reports continued employment. Patient signed/agreed to treatment plan this date.     Review of long term goals: Patient signed/agreed to treatment plan this date.     Diagnosis: History of Generalized Anxiety, Claustrophobia and Panic Attacks  Chronic pain syndrome  Mood disorder due to medical condition  TBI  R/O Major Depressive Disorder, Severe  R/O PTSD    Plan and Follow up: Continue psychotherapy sessions every 2 weeks to further address chronic pain, depression and anxiety symptoms. Continue EMDR therapy and will begin resourcing. Implement healthy coping skills to manage symptoms. Practice container  exercise and letting go exercise, as presented in session, in daily living. Follow pain center plan of care.     Discharge Criteria/Planning: Patient will continue with follow-up until therapy can be discontinued without return of signs and symptoms.    Urmila Salinas 9/15/2017

## 2021-06-14 NOTE — PROGRESS NOTES
Injection - Other  Date/Time: 11/8/2017 9:40 AM  Performed by: GRETEL CRUZ  Authorized by: GRETEL CRUZ   Comments:     TRIGGER POINT INJECTION  Performed on: 11/8/2017    Indra Mccarthy is a 52-year-old woman who has pain in the right buttocks and hip area.  She has had sacroiliac joint injections done which have given her some good relief.  She was found to have tenderness of the muscles on exam and was referred here for trial of trigger point injections.  If she continues to have deeper pain we could repeat a sacroiliac joint injection in the future.    Pre Procedure Diagnosis:  Myofascial pain  Vital Signs:  As per intra-procedure documentation    The procedure was discussed with Megan Burrell in detail along with the attendant risks, including but not limited to: nerve injury, infection, bleeding, allergic reaction, or worsening of pain.  Informed consent was obtained and patient elected to proceed.    After informed consent was obtained the patient was seated in the examination chair.  The right upper buttocks and right lateral hip areas were prepped sterilely with alcohol.  A 1 1/4 inch 27-gauge needle was used.  There were trigger points injected in the medial and lateral portion of the right upper gluteal musculature and over the right greater trochanter.  A total of 10 mL of 0.25% Marcaine with 10 mg of Decadron was used in divided doses.    The patient tolerated the procedure well.  The patient was taken to the recovery area after the injection.  There were no complications.       Pain Score:   4 (Post-TPI to Rt buttock)    If Megan Burrell has any questions or concerns after discharge, she was instructed to call us.

## 2021-06-14 NOTE — PROGRESS NOTES
"11/29/2017    Start time: 10:05 AM    Stop Time: 11:00 AM   Session # 6    Megan Burrell is a 52 y.o. female is being seen today for    Chief Complaint   Patient presents with     Mental Health Note   . History of Generalized Anxiety, Claustrophobia and Panic Attacks  Chronic pain syndrome  Mood disorder due to medical condition  TBI  R/O Major Depressive Disorder, Severe  R/O PTSD    New symptoms or complaints: None    Functional Impairment:   Personal: 4  Family: 4  Work: 4  Social:4    Clinical assessment of mental status: Megan Burrell presented on time.  She was open and cooperative, and dressed appropriately for this session and weather. Her memory was in tact, but she reports short term memory concerns .  Her  speech was appropriate.  Language was appropriate.  Concentration and focus is mostly on task, but was redirected at times. Psychosis is not noted or reported. She reports her mood is anxious .  Affect is congruent with speech.  Fund of knowledge is adequate. Insight is adequate for therapy.     Suicidal/Homicidal Ideation present: None Reported This Session    Patient's impression of their current status: Patient reported she has been struggling with anxiety and has been sick lately. She reports she followed up with her medical provider to address her recent illness. She continues to acknowledge that anxiety and depression symptoms are triggered by her chronic pain symptoms, work stressors, holidays and other personal stressors as well. She reported difficulty with knee pain and her knee \"locking up\". Patient reports she struggles with motivation, but has been working on engaging in some small cleaning activities at home. She reports isolating from others at times. Patient engaged in personal values discussion and discussed some things she is looking forward to. Patient identified difficulty with consistently using healthy coping skills that help manage symptoms in daily living, including " implementing meditation, letting go exercises and other stress relieving tools in daily living. She discussed stressors at work and changes at home. Patient processed difficulties with setting boundaries with scheduling at work. She talked about tobacco use and was able to identify triggers, as well as barriers to quitting. She did not comment on alcohol use at this time. When asked about the last session she missed, she reported she forgot and did not have it written down. She identified a plan to prevent this from happening in the future.     Therapist impression of patients current state: Patient appears to be struggling with her mental health and chronic pain symptoms. She seems to be struggling to be consistent with using meditation and grounding skills in daily living. It seems there are some methods of stress relief that she finds beneficial. Patient was encouraged to incorporate meditation/relaxation in a more purposeful way, as it does seem to benefit her. Patient seemed open to discussion on personal values and she seemed to gain insight. It appears symptoms of anxiety are difficult to manage and stressors at work and home seem to be triggers. It appears setting consistent boundaries at work has been a challenge and will be important to further discuss. Discussed ways she can address motivation concerns and also praised her for the efforts she has been making. Patient seems to have a desire to quit smoking, and discussion will continue regarding triggers and barriers. Will continue to assess to R/O Major Depression. Will continue to assess for PTSD, if symptoms change or worsen.        Type of psychotherapeutic technique provided: Client centered, CBT and Mindfulness    Progress toward short term goals: Patient reports following her pain center plan of care. Patient reports working on implementing coping skills, including meditation, to manage mental health and chronic pain symptoms, but has been met with  some barriers lately. She reports engaging in leisure activities as she is able, but this has been challenging. She reports continued employment. Continue discussion on smoking cessation.     Review of long term goals: Not done at today's visit    Diagnosis: History of Generalized Anxiety, Claustrophobia and Panic Attacks  Chronic pain syndrome  Mood disorder due to medical condition  TBI  R/O Major Depressive Disorder, Severe  R/O PTSD    Plan and Follow up: Continue psychotherapy sessions every 2 weeks to further address chronic pain, depression and anxiety symptoms. Continue EMDR therapy and will continue resourcing. Implement healthy coping skills to manage symptoms. Practice container exercise and letting go exercise, as presented in session, in daily living. Consider meditation prior to bed to help with relaxation. Consider starting a handwork project to reduce boredom and increase focus in the moment. Continue discussing plan for smoking cessation. Consider acupuncture services. Follow pain center plan of care.       Discharge Criteria/Planning: Patient will continue with follow-up until therapy can be discontinued without return of signs and symptoms.    Urmila Salinas 11/29/2017

## 2021-06-14 NOTE — PROGRESS NOTES
Subjective:   Megan Burrell is a 52 y.o. female who presents for evaluation of pain. See rooming evaluation. Patient was last seen 10/13/17    CC: Pain. Review of current status. Patient general opinion of symptoms management and function is fair    Major issues:  1. Lumbar Disc Degeneration    2. Chronic pain    3. Sacroiliitis          Patient Active Problem List   Diagnosis     Crohn's Disease     Arthritis     Sacroiliitis     Cervical Disc Degeneration     Lumbar Disc Degeneration     Hypertension     Esophageal Reflux     Duodenal Ulcer     Somatic Dysfunction Of Costochondral Region     HPI:   Impact of pain treatments:   Analgesia: fair   ADL's: Work: She had a 10# restriction. She is working. She indicates she is struggling a bit. Grooming: She struggles with socks, shoes and occasionally pants.  Household: She is not doing very well. She indicates her  is doing most of the chores at home. Social: Patient is engaged with family and friends in a meaningful fashion. She is hosting Gautam at her home and she credits this worth working with mental health  AE's: none  Aberrant behavior: none    Aggravating factors: sitting longer than 10-15 minutes  Alleviating factors: interventional approaches,   DME: ice (takes the edge off)  Location/Laterality of the pain: knee pain, back, hip  Timing: constant  Quality: aching, sharp  Severity:3/10    Activities Impaired by Increasing Pain Severity: F= 5  3-Enjoy  4-Work, Enjoy  5-Active, Mood Work Enjoy  6-Sleep, Active, Mood Work Enjoy  7-Walk, Sleep, Active, Mood Work Enjoy  8-Relate, Walk, Sleep, Active, Mood Work Enjoy      Medication:   Current Outpatient Prescriptions:      ALPRAZolam (XANAX) 0.5 MG tablet, , Disp: , Rfl:      amLODIPine (NORVASC) 10 MG tablet, , Disp: , Rfl:      aspirin (ASPIRIN LOW DOSE) 81 MG EC tablet, Take 81 mg by mouth daily., Disp: , Rfl:      cloNIDine HCl (CATAPRES) 0.1 MG tablet, Take 0.2 mg by mouth 2 (two) times a day. ,  Disp: , Rfl:      cyclobenzaprine (FLEXERIL) 5 MG tablet, TAKE 1 TABLET BY MOUTH EVERY EVENING AND 2 TABLETS BY MOUTH AT BEDTIME., Disp: 270 tablet, Rfl: 0 - she is gettign benefit using I the evening and bedtime     famotidine (PEPCID) 20 MG tablet, Take 20 mg by mouth 2 (two) times a day., Disp: , Rfl:      gabapentin (NEURONTIN) 300 MG capsule, TAKE 1 CAPSULE BY MOUTH WITH BREAKFAST, 1 WITH LUNCH, 1 WITH DINNER, AND 3 CAPSULES AT BEDTIME, Disp: 540 capsule, Rfl: 0 - continued     hydrOXYzine (VISTARIL) 50 MG capsule, , Disp: , Rfl:      losartan (COZAAR) 100 MG tablet, Take 100 mg by mouth daily., Disp: , Rfl:      metoprolol (LOPRESSOR) 25 MG tablet, Take 1 tablet by mouth 2 (two) times a day., Disp: , Rfl:      ondansetron (ZOFRAN-ODT) 4 MG disintegrating tablet, , Disp: , Rfl:      traZODone (DESYREL) 100 MG tablet, Take 100 mg by mouth bedtime as needed for sleep., Disp: , Rfl:      Interventional:   TRIGGER POINT INJECTION  Performed on: 11/8/2017  There were trigger points injected in the medial and lateral portion of the right upper gluteal musculature and over the right greater trochanter.  12/15/17:  She felt this was very assistive    Injection of the knee at [facility]. She indicates they are considering a Synvisc injection.     Rehabilitation: She is doing more walking    Integrative Approaches: Previously discussed massage and acupuncture - she did not need after the TPI and address of the knee    Mental Health: She is working with [employer]. It is noted she had a couple of recent no-show     Review of Systems   Constitutional- + activity intolerance  Musculoskeletal- +pain, + swelling  Neuro- - cognitive changes, - radicular  GI: - constipation  :  - urinary difficulty  Psych-  + mood concerns    Social  No family history on file.  History   Smoking Status     Former Smoker   Smokeless Tobacco     Not on file     Comment: On the edwardo-controlhailer     History   Alcohol use Not on file  "    History   Drug Use Not on file     History   Sexual Activity     Sexual activity: Not on file       Objective:     Vitals:    12/15/17 0849   BP: (!) 139/94   Pulse: 80   Resp: 16   Weight: 155 lb (70.3 kg)   Height: 5' 5\" (1.651 m)   PainSc:   3       Constitutional:  Pleasant and cooperative female who presents alone today.   Psychiatric: Mood and affect are appropriate for the situation, setting and topic of discussion.  Patient does not appear sedated.  Integumentary:  Observed skin WNL  HEENT: EOM's grossly intact.    Chest: Breathing is non-labored.   Neurological:  Alert and oriented in all spheres including: time, place, person and situation.    Diagnostics:   Imaging:  No new imaging available to review    :  Dated 12/15/2017    STEPHANIE Score: 46    Assessment:   Megan Burrell is a 52 y.o. female seen in clinic today for chronic low back pain which has been stable. She has struggled with chronic pancreatitis and intermittent acute pancreatitis flare up's.       She has completely tapered off the opioids. Continuing forward the focus will remain on a non-opioid plan of care. It was noted there were some cognitive issues, and issues with the UDS. The decision was made with the pt to end opioids.      Discussed a celiac plexus block as an option for acute pancreatic pain flare-ups was had at the last visit. She indicates the GI doc has been doing more recently and did not endorse this approach to care at this time      She is interested in working with mental health.    Plan:   Plan/NextSteps:     Follow up: 3 monhts      Education:   Please note there are 2 Italia's at the [facility]. Today you saw Italia Ramos when communicating any needs please specify the last name. Thank you    Please call Monday-Friday for problems or questions and one of the clinical support staff (CSS) will help to get things figured out. The number is [phone]. Some folks are using [employer] to send an e-mail " (mychart message). Please remember some issues require an office visit.     Today we reviewed the plan of care and answered questions.      Health Maintenance: Please continue to see primary care for general medical prevention and illness care.    Mental Health: Continue with [facility]    Rehabilitation: Continue your home exercises    Interventional: Injections remain available. Keep up with [facility] I am interested to see how the synvisc helps your knees    Records: Reviewed to assist with preparation for the office visit and are reflected throughout the note.    Medication:   Medication: Continue the gabapentin and the cyclobenzaprine I understand you do not need a refill today as you have medication at home        Patient Arrived @ 0840 for a 0920 appointment.     TT: [id number]  CT: over half spent in education and counseling as outlined in the plan.    Hodan Ramos APRN FNP-BC  [address] [facility] [address]   [phone]  [phone]

## 2021-06-14 NOTE — PROGRESS NOTES
D) Intent to discharge letter sent to patient this date. She has not attended since 11/4/2020. She will be discharged if she does not return for services by 2/10/2021. See letter in chart for further information.

## 2021-06-15 NOTE — PROGRESS NOTES
Discharge Summary    Session Type: Patient is presenting for an Individual session.    Dates of service 10/22/2020 to 2/12/2021 # Sessions completed: 1 DA and 2 sessions in 2020. 0 sessions in 2021.     Diagnosis at Intake  Major depression, severe, recurrent  Generalized Anxiety Disorder  Panic Attacks  Chronic Pain Syndrome  Chronic PTSD    Diagnosis at Discharge  Major depression, severe, recurrent  Generalized Anxiety Disorder  Panic Attacks  Chronic Pain Syndrome  Chronic PTSD    Progress toward goal 1:Decrease average anxiety level from 8 to 5.  Unmet    Progress toward goal 2:  Decrease average depression level from 8 to 5.  Unmet    Additional goals:   Increase % acceptance from 50 to 70.  Unmet    Additional comments: Patient resumed psychotherapy after a long absence. She attended for a brief period of time as she was struggling with some family stressors, then she stopped scheduling/attending. After a long absence from attending, she was discharged.    Reason for discharge:Pt dropped out     Prognosis at discharge:Poor    Recommendations and referrals at discharge: Follow medical plan of care. Resume psychotherapy, if interested.         Urmila Salinas      2/12/2021  6:21 PM
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
## 2021-06-16 PROBLEM — F11.90 CHRONIC, CONTINUOUS USE OF OPIOIDS: Status: ACTIVE | Noted: 2019-11-19

## 2021-06-16 PROBLEM — Z96.652 S/P REVISION OF TOTAL KNEE, LEFT: Status: ACTIVE | Noted: 2019-11-18

## 2021-06-16 PROBLEM — Z96.652 S/P TOTAL KNEE ARTHROPLASTY, LEFT: Status: ACTIVE | Noted: 2019-02-21

## 2021-06-16 NOTE — PROGRESS NOTES
3/2/2018    Start time: 1:05 PM    Stop Time: 2:05 PM   Session # 2    Megan Burrell is a 52 y.o. female is being seen today for    Chief Complaint   Patient presents with     Mental Health Note   . History of Generalized Anxiety, Claustrophobia and Panic Attacks  Chronic pain syndrome  Mood disorder due to medical condition  TBI  R/O Major Depressive Disorder, Severe  R/O PTSD    New symptoms or complaints: None    Functional Impairment:   Personal: 4  Family: 4  Work: 4  Social:4    Clinical assessment of mental status: Megan Burrell presented on time.  She was open and cooperative, and dressed appropriately for this session and weather. Her memory was in tact, although she reports difficulty in daily living .  Her  speech was appropriate/quiet.  Language was appropriate/quiet.  Concentration and focus is on task. Psychosis is not noted or reported. She reports her mood is depressed and anxious .  Affect is congruent with speech.  Fund of knowledge is adequate. Insight is adequate for therapy.     Suicidal/Homicidal Ideation present: None Reported This Session    Patient's impression of their current status: Patient reported she has been struggling with anxiety and depression symptoms. She states she has been isolating, sleeping too much, having difficulty with motivation and has a depressed mood. She has been having difficulty with managing chronic medical conditions. She reports she will continue to follow up with her medical providers. Patient has not scheduled with acupuncture, but has identified this as a goal. She continues to acknowledge that anxiety and depression symptoms are triggered by her chronic pain and other medical symptoms, work stressors and other personal stressors as well. Patient engaged in personal values discussion and discussed some things she is looking forward to. Patient identified she is struggling to consistently use healthy coping skills however, she reported using meditation,  "letting go exercises and other stress relieving tools in daily living at times. Patient will be going to [place] next week and is hopeful this will help with mood improvement. She discussed stressors at work that have been challenging. She talked about tobacco use and was able to identify triggers, as well as barriers to quitting. She did not comment on alcohol use at this time. Patient participated in a grounding exercise in session and expressed benefit. When discussing attendance, patient reported barriers including winter weather and depression/anxiety.        Therapist impression of patients current state: Patient appears to be struggling with her mental health and chronic pain symptoms. She seems to be using meditation and grounding skills in daily living as she is able. It seems there are some methods of stress relief that she finds beneficial. Patient was encouraged to continue meditation/relaxation on a regular basis, as it does seem to benefit her. Also suggesting \"acting as if\" when feeling depressed or anxious. Patient seemed open to discussion on personal values and she seemed to gain insight. It appears symptoms of anxiety are difficult to manage, at times,  and stressors at work and home seem to be triggers. It appears she continues to struggle with setting more appropriate boundaries with workplace scheduling. Patient seems to have a desire to make a plan to quit smoking, and discussion will continue regarding triggers and barriers. Challenged her to be mindful in daily living regarding her triggers and emotional responses to stressors. Will continue to assess to R/O Major Depression. Will continue to assess for PTSD, if symptoms change or worsen. Presented a grounding exercise in session and patient appeared to benefit. Encouraged more regular attendance to gain most benefit from psychotherapy.     Type of psychotherapeutic technique provided: Client centered, CBT and Mindfulness    Progress toward short " term goals:Patient reports following her pain center plan of care. Patient reports working on implementing coping skills, including meditation, to manage mental health and chronic pain symptoms. She reports engaging in social activities as she is able, but this has been challenging. She reports continued employment. Continue discussion on smoking cessation.  Patient signed/agreed to treatment plan this date.     Review of long term goals: Patient signed/agreed to treatment plan this date.     Diagnosis: History of Generalized Anxiety, Claustrophobia and Panic Attacks  Chronic pain syndrome  Mood disorder due to medical condition  TBI  R/O Major Depressive Disorder, Severe  R/O PTSD    Plan and Follow up: Continue psychotherapy sessions every 2 weeks to further address chronic pain, depression and anxiety symptoms. Continue EMDR therapy and will continue resourcing. Implement healthy coping skills to manage symptoms. Practice container exercise, grounding exercise and  letting go exercise, as presented in session, in daily living. Consider meditation prior to bed to help with relaxation. Continue discussing plan for smoking cessation. Consider acupuncture services. Follow pain center plan of care.             Discharge Criteria/Planning: Patient will continue with follow-up until therapy can be discontinued without return of signs and symptoms.    Urmila Salinas 3/2/2018
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
## 2021-06-20 NOTE — PROGRESS NOTES
Pain Intensity:  The pain is moderate at the moment    Personal Care:  I can look after myself normally w/o causing extra pain    Lifting:  Pain prevents me from lifting heavy weights off the floor, but I can manage if they are conveniently placed eg. on a table    Walking:  Pain prevents me from walking more than 1 mile    Sitting:   Pain prevents me from sitting more than 30 minutes    Standing  I can stand as long as I want w/o estra pain    Sleeping  My sleep occasionally disturbed by pain    Sex Life:  My sex life is normal but causes some extra pain    Social life:  Pain has no significant effect on my social life apart from limiting my more energetic interests eg. sports    Traveling:  Pain is bad but I manage journeys over 2 hours
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
## 2021-06-20 NOTE — PROGRESS NOTES
Outpatient Mental Health Treatment Plan    Name:  Megan Burrell  :  1965  MRN:  [id number]    Treatment Plan:  Updated Treatment Plan  Intake/initial treatment plan date:  17  Benefit and risks and alternatives have been discussed: Yes  Is this treatment appropriate with minimal intrusion/restrictions: Yes  Estimated duration of treatment:  Approximately 20 sessions.  Anticipated frequency of services:  Every 2 weeks  Necessity for frequency: This frequency is needed to establish therapeutic goals and for continuity of care in order to monitor progress.  Necessity for treatment: To address cognitive, behavioral, and/or emotional barriers in order to work toward goals and to improve quality of life.    Session Type: Patient is presenting for an Individual session.    Plan:           ?   ? Anxiety    Goal:  Decrease average anxiety level from 9 to 5.   Strategies: ? [x]Learn and practice relaxation techniques and other coping        strategies (e.g., thought stopping, reframing, meditation)     ? [x] Increase involvement in meaningful activities     ? [x] Discuss sleep hygiene     ? [x] Explore thoughts and expectations about self and others     ? [x] Identify and monitor triggers for panic/anxiety symptoms     ? [x] Implement physical activity routine (with physician approval)     ? [x] Consider introduction of bibliotherapy and/or videos     ? [x] Continue compliance with medical treatment plan (or explore          barriers)                                       [x]     ?Degree to which this is a problem: 4  Degree to which goal is met: 1  Date of Review: 18  Adjustment to disability / changes    Goal:  Increase % acceptance from 50 to 80.   Strategies: ?[x]  Explore thoughts and expectations about self and others   [x] Explore emotional reactions to illness/injury     ?[x] Learn and practice relaxation techniques and other coping        strategies     [x] Implement physical activity routine (with  "physician approval)                [x] Increase involvement in meaningful activities                [x] Engage in values clarification and goal-setting     [x] Consider introduction of bibliotherapy and/or videos                [x] Explore barriers to cooperation with rehabilitation program  Degree to which this is a problem: 4  Degree to which goal is met: 1  Date of Review: 9/27/18  Chronic pain  Goal:  ? Decrease average pain level from 8 to 5.   ? Increase % acceptance of pain from 50 to 80.   Strategies: ? [x] Explore thoughts and expectations about self and others    [x] Explore emotional reactions to illness/injury      ? [x] Learn and practice relaxation techniques and other coping          strategies            [x] Implement physical activity routine (with physician approval)     ? [x] Engage in values clarification and goal-setting     ? [x] Consider introduction of bibliotherapy and/or videos     ? [x] Increase involvement in meaningful activities     ? [x] Discuss sleep hygiene     ? [x] Process grief (loss of significant person, independence, role,          etc.)     ? [x] Assess for suicide risk     ?  Degree to which this is a problem: 4  Degree to which goal is met: 1  Date of Review: 9/27/18       Functional Impairment:  1=Not at all/Rarely  2=Some days  3=Most Days  4=Every Day    Personal : 4  Family : 4  Social : 4   Work/school : 4    Diagnosis:   History of Generalized Anxiety, Claustrophobia and Panic Attacks  Chronic pain syndrome  Mood disorder due to medical condition  TBI  PTSD  R/O Major Depressive Disorder, Severe       WHODAS 2.0 12-item version 60.42%     Scores presented in qualifiers to represent level of disability.  SEVERE Problem (high, extreme, ...) 50-95%    H1= 30  H2= 3 -4  H3= 3-4        Clinical assessments and measures completed:. TREVIN-7 and PHQ-9     Strengths:  [place], reliable   Limitations:  \"I want to get back to who I was\".   Cultural Considerations: Patient is a " 53 year old, ,  female, who has a history of anxiety, panic attacks, depression, trauma, TBI and chronic pain concerns.     Persons responsible for this plan: Patient and Provider            Psychotherapist Signature           Patient Signature:              Guardian Signature             Provider: Performed and documented by Urmila Salinas [facility]   Date:  9/27/18
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
## 2021-06-23 NOTE — PROGRESS NOTES
Outpatient Mental Health Treatment Plan    Name:  Megan Burrell  :  1965  MRN:  [id number]    Treatment Plan:  Updated Treatment Plan  Intake/initial treatment plan date:  17  Benefit and risks and alternatives have been discussed: Yes  Is this treatment appropriate with minimal intrusion/restrictions: Yes  Estimated duration of treatment:  Approximately 20 sessions.  Anticipated frequency of services:  Every 2 weeks  Necessity for frequency: This frequency is needed to establish therapeutic goals and for continuity of care in order to monitor progress.  Necessity for treatment: To address cognitive, behavioral, and/or emotional barriers in order to work toward goals and to improve quality of life.    Session Type: Patient is presenting for an Individual session.    Plan:           ?   ? Anxiety    Goal:  Decrease average anxiety level from 9 to 5.   Strategies: ? [x]Learn and practice relaxation techniques and other coping        strategies (e.g., thought stopping, reframing, meditation)     ? [x] Increase involvement in meaningful activities     ? [x] Discuss sleep hygiene     ? [x] Explore thoughts and expectations about self and others     ? [x] Identify and monitor triggers for panic/anxiety symptoms     ? [x] Implement physical activity routine (with physician approval)     ? [x] Consider introduction of bibliotherapy and/or videos     ? [x] Continue compliance with medical treatment plan (or explore          barriers)                                       [x]     ?Degree to which this is a problem: 4  Degree to which goal is met: 1  Date of Review: 19     ? Depression    Goal:  Decrease average depression level from 8 to 5.   Strategies:    ?[x] Decrease social isolation     [x] Increase involvement in meaningful activities     ?[x] Discuss sleep hygiene     ?[x] Explore thoughts and expectations about self and others     ?[x] Process grief (loss of significant person, independence, role,  "       etc.)     ?[x] Assess for suicide risk     ?[x] Implement physical activity routine (with physician approval)     [x] Consider introduction of bibliotherapy and/or videos     [x] Continue compliance with medical treatment plan (or explore         barriers)       ?  Degree to which this is a problem: 4  Degree to which goal is met: 1  Date of Review: 1/29/19  Chronic pain  Goal:  ? Decrease average pain level from 8 to 5.   ? Increase % acceptance of pain from 50 to 80.   Strategies: ? [x] Explore thoughts and expectations about self and others    [x] Explore emotional reactions to illness/injury      ? [x] Learn and practice relaxation techniques and other coping          strategies            [x] Implement physical activity routine (with physician approval)     ? [x] Engage in values clarification and goal-setting     ? [x] Consider introduction of bibliotherapy and/or videos     ? [x] Increase involvement in meaningful activities     ? [x] Discuss sleep hygiene     ? [x] Process grief (loss of significant person, independence, role,          etc.)     ? [x] Assess for suicide risk     ?  Degree to which this is a problem: 4  Degree to which goal is met: 1  Date of Review: 1/29/19       Functional Impairment:  1=Not at all/Rarely  2=Some days  3=Most Days  4=Every Day    Personal : 4  Family : 4  Social : 4   Work/school : 4    Diagnosis: History of Generalized Anxiety,  Panic Attacks  Chronic pain syndrome  Mood disorder due to medical condition  TBI  PTSD  R/O Major Depressive Disorder, Severe       WHODAS 2.0 12-item version 45.83%      Scores presented in qualifiers to represent level of disability.  MODERATE Problem (medium, fair, ...) 25-49%    H1= 20  H2= 25  H3= 30        Clinical assessments and measures completed:. TREVIN-7, PHQ-9 and PANSI     Strengths:  [place], reliable   Limitations:  \"I want to get back to who I was\".   Cultural Considerations: Patient is a 53 year old, ,  female, " who has a history of anxiety, panic attacks, depression, trauma, TBI and chronic pain concerns.        Persons responsible for this plan: Patient and Provider            Psychotherapist Signature           Patient Signature:              Guardian Signature             Provider: Performed and documented by Urmila Salinas [facility]   Date:  1/29/19

## 2021-06-24 NOTE — ANESTHESIA CARE TRANSFER NOTE
Last vitals:   Vitals:    02/21/19 1455   BP: 102/63   Pulse: 75   Resp: 16   Temp: 37.2  C (98.9  F)   SpO2: 91%     Patient's level of consciousness is awake  Spontaneous respirations: yes  Maintains airway independently: yes  Dentition unchanged: yes  Oropharynx: oropharynx clear of all foreign objects    QCDR Measures:  ASA# 20 - Surgical Safety Checklist: WHO surgical safety checklist completed prior to induction    PQRS# 430 - Adult PONV Prevention: 4558F - Pt received => 2 anti-emetic agents (different classes) preop & intraop  ASA# 8 - Peds PONV Prevention: NA - Not pediatric patient, not GA or 2 or more risk factors NOT present  PQRS# 424 - Julee-op Temp Management: 4559F - At least one body temp DOCUMENTED => 35.5C or 95.9F within required timeframe  PQRS# 426 - PACU Transfer Protocol: - Transfer of care checklist used  ASA# 14 - Acute Post-op Pain: ASA14B - Patient did NOT experience pain >= 7 out of 10

## 2021-06-24 NOTE — ANESTHESIA PROCEDURE NOTES
Peripheral Block    Patient location during procedure: pre-op  Start time: 2/21/2019 12:20 PM  End time: 2/21/2019 12:24 PM  post-op analgesia per surgeon order as noted in medical record  Staffing:  Performing  Anesthesiologist: Selene Sahu MD  Preanesthetic Checklist  Completed: patient identified, site marked, risks, benefits, and alternatives discussed, timeout performed, consent obtained, airway assessed, oxygen available, suction available, emergency drugs available and hand hygiene performed  Peripheral Block  Block type: saphenous  Prep: ChloraPrep  Patient position: supine  Patient monitoring: blood pressure, heart rate, continuous pulse oximetry and cardiac monitor  Laterality: left  Injection technique: ultrasound guided            Needle  Needle type: Stimuplex   Needle gauge: 22 G  Needle length: 4 in  no peripheral nerve catheter placed  Assessment  Injection assessment: no difficulty with injection, negative aspiration for heme, no paresthesia on injection and incremental injection

## 2021-06-24 NOTE — ANESTHESIA PROCEDURE NOTES
Spinal Block    Patient location during procedure: OR  Start time: 2/21/2019 1:21 PM  End time: 2/21/2019 1:25 PM  Reason for block: primary anesthetic    Staffing:  Performing  Anesthesiologist: Mahad Melendez MD    Preanesthetic Checklist  Completed: patient identified, risks, benefits, and alternatives discussed, timeout performed, consent obtained, airway assessed, oxygen available, suction available, emergency drugs available and hand hygiene performed  Spinal Block  Patient position: sitting  Prep: ChloraPrep  Patient monitoring: heart rate, cardiac monitor, continuous pulse ox and blood pressure  Approach: midline  Location: L3-4  Needle type: pencil-tip   Needle gauge: 24 G

## 2021-06-24 NOTE — PROGRESS NOTES
2/14/2019    Start time: 9:05 AM    Stop Time: 9:55 AM   Session # 3    Megan Burrell is a 53 y.o. female is being seen today for    Chief Complaint   Patient presents with     Mental Health Note   . Generalized Anxiety, Panic Attacks  Chronic pain syndrome  Mood disorder due to medical condition  History of head injury  PTSD  R/O Major Depressive Disorder, Severe        New symptoms or complaints: None    Functional Impairment:   Personal: 4  Family: 4  Work: 4  Social:4    Clinical assessment of mental status: Megan Burrell presented on time.  She was open and cooperative, and dressed appropriately for this session and weather. Her memory was in tact .  Her  speech was appropriate.  Language was appropriate.  Concentration and focus is on task. Psychosis is not noted or reported. She reports her mood is anxious and depressed .  Affect is congruent with speech.  Fund of knowledge is adequate. Insight is adequate for therapy.     Suicidal/Homicidal Ideation present: None Reported This Session    Patient's impression of their current status: Patient reports she continues to experience symptoms of depression and anxiety related to personal, work and medical stressors. Patient will have knee surgery on 2/21/19. She continues to struggle with nightmares from the traumatic event at work, leading to feelings of panic/anxiety and this is impacting daily living. She reports continued difficulty with sleep. She reports worry/concern about her father's recent health challenges.         Therapist impression of patients current state: Patient appears to be struggling with her mental health and chronic pain symptoms. She reports lack of motivation/interest, she has loss of appetite, and she has poor sleep. Patient did report consistency with following through with one small goal per day. Patient seems to dwell on her fears. Her hygiene continues to be impacted due to depressed mood and also difficulty with the bathing process  due to pain. She continues to stay isolated most days. Patient discussed thoughts and feelings about her father's recent health challenges. Discussed ways to cope. She discussed thoughts and feelings about her 's new job and her daughter's upcoming internship. This seemed to give her a sense of w[place]ka and hopefulness. She reports she has been working on using grounding skills and finding benefit for brief periods of time. Patient was encouraged to continue meditation/relaxation on a regular basis, as it does seem to benefit her. Patient seemed open to discussion on personal values and she seemed to gain insight. It appears symptoms of anxiety are difficult to manage, much of the time,  and stressors with work/[employer], her health and trauma history. Patient reports continued feelings of panic when she has nightmares (nightly) of past trauma at work. Patient does report the flashbacks have decreased. She does seem to be following up with medical needs as recommended and will have knee surgery on 2/21/19. Patient processed thoughts and feelings about recent increase in pain symptoms. She processed thoughts and feelings about the changes in her life since her injury. Discussed career needs and grief response regarding inability to work at this time. She processed thoughts and feelings about looking for future employment and ideas were discussed. Patient seems to have a desire to make a plan to quit smoking, and she continues to work on this. Challenged her to be mindful in daily living regarding her triggers and emotional responses to stressors. Will continue to assess to R/O Major Depression. Presented grounding exercise and patient appeared to benefit. Encouraged regular attendance to gain most benefit from psychotherapy. She reported she has been more consistent with taking walks as she is able.           Type of psychotherapeutic technique provided: Client centered, CBT and Mindfulness    Progress toward short term  goals:Patient reports following her pain center plan of care. Patient reports working on implementing coping skills, including meditation, to manage mental health and chronic pain symptoms. She is working on following through with goal of taking walks with regularity. She continues to work on at least one goal per day. She is addressing career/employment needs. Patient signed/agreed to treatment plan this date.     Review of long term goals: Patient signed/agreed to treatment plan this date.     Diagnosis: Generalized Anxiety, Panic Attacks  Chronic pain syndrome  Mood disorder due to medical condition  History of head injury  PTSD  R/O Major Depressive Disorder, Severe      Plan and Follow up: Continue psychotherapy sessions every 1-2 weeks to further address chronic pain, depression and anxiety symptoms. Continue EMDR therapy and will continue resourcing/assessment. Implement healthy coping skills to manage symptoms. Practice grounding and meditation exercises in daily living. Consider meditation prior to bed to help with relaxation. Continue discussing plan for smoking cessation. Consider acupuncture services. Follow pain center plan of care as well as all other medical recommendations. Address medical concerns as needed. Continue with walking goal and other small, achievable goals as discussed in session. Continue discussion on career needs.                  Discharge Criteria/Planning: Patient will continue with follow-up until therapy can be discontinued without return of signs and symptoms.    Urmila Salinas 2/14/2019

## 2021-06-24 NOTE — ANESTHESIA PREPROCEDURE EVALUATION
Anesthesia Evaluation      Patient summary reviewed     Airway   Mallampati: II  Neck ROM: full   Pulmonary - negative ROS and normal exam    breath sounds clear to auscultation  (+) a smoker                         Cardiovascular - negative ROS and normal exam  (+) hypertension, ,     Rhythm: regular  Rate: normal,         Neuro/Psych - negative ROS     Endo/Other - negative ROS      GI/Hepatic/Renal    (+) GERD,             Dental - normal exam                        Anesthesia Plan  Planned anesthetic: peripheral nerve block and spinal    ASA 2     Anesthetic plan and risks discussed with: patient and spouse    Post-op plan: routine recovery

## 2021-06-26 NOTE — PROGRESS NOTES
Progress Notes by Hodan Ramos CNP at 3/21/2018  8:40 AM     Author: Hodan Ramos CNP Service: -- Author Type: Nurse Practitioner    Filed: 3/21/2018  9:47 AM Date of Service: 3/21/2018  8:40 AM Status: Signed    : Hodan Ramos CNP (Nurse Practitioner)       Subjective:   Megan Burrell is a 52 y.o. female who presents for evaluation of pain. See rooming evaluation. Patient was last seen 12/15/18.     CC: Pain. Review of current status.     Major issues:  1. Cervical Disc Degeneration    2. Other chronic pain    3. Lumbar Disc Degeneration    4. Osteoarthritis of left knee, unspecified osteoarthritis type    5. H/O chronic pancreatitis        Patient Active Problem List   Diagnosis   ? Crohn's Disease   ? Arthritis   ? Sacroiliitis   ? Cervical Disc Degeneration   ? Lumbar Disc Degeneration   ? Hypertension   ? Esophageal Reflux   ? Duodenal Ulcer   ? Somatic Dysfunction Of Costochondral Region       HPI: Questionnaires and records reviewed.    Location/Laterality of the pain: left knee, right buttock, right low back; no acute pancreatitis flare up's in a year  Severity: Today: 3/10   Since last visit pain scores: at best 2. at worst 7. on average 3  Quality: achy, sharp  Timing: constant  Aggravating factors: walking, lifting  Alleviating factors: laying  Any New pain, injuries, falls: none  Since last visit pain has: not changed  Associated symptoms:    Numbness: -   Weakness: + left knee    Bladder or Bowel loss of control: -   Night pain: +   Fever and/or Chills: -   Unexplained weight loss: -  DME: ice (takes the edge off)    Functional Symptoms: Pain interferes with:  Sleep: +  Walking: -  Work: +  ADL's: +  Relationships/Social: -  Sexual health: +    Impact of pain treatments:   Patient reports function has improved with current pain treatment: yes  Analgesia: fair   ADL's: Work: She had a 10# restriction. She is working.   AE's: none   Aberrant behavior: none    Activities  Impaired by Increasing Pain Severity: F= 6  3-Enjoy  4-Work, Enjoy  5-Active, Mood Work Enjoy  6-Sleep, Active, Mood Work Enjoy  7-Walk, Sleep, Active, Mood Work Enjoy  8-Relate, Walk, Sleep, Active, Mood Work Enjoy    Mood related to pain:   Depressed: +   Angry: -   Frustrated: -   Anxious: +   Helpless/Hopeless: -    Pertinent Medical Hx/Safety:   Blood thinners: -   Pregnant or wanting to become pregnant: -   New diagnostics since last visit: -   ED/UC visit since last visit: -   New treatment or New medical condition: -    Pain Plan of Care:   Medication:    Current Outpatient Prescriptions:   ?  ALPRAZolam (XANAX) 0.5 MG tablet, , Disp: , Rfl: she is getting #30/month  ?  amLODIPine (NORVASC) 10 MG tablet, , Disp: , Rfl:   ?  aspirin (ASPIRIN LOW DOSE) 81 MG EC tablet, Take 81 mg by mouth daily., Disp: , Rfl:   ?  cloNIDine HCl (CATAPRES) 0.1 MG tablet, Take 0.2 mg by mouth 2 (two) times a day. , Disp: , Rfl:   ?  cyclobenzaprine (FLEXERIL) 5 MG tablet, TAKE 1 TABLET BY MOUTH EVERY EVENING AND 2 TABLETS BY MOUTH AT BEDTIME., Disp: 270 tablet, Rfl: 0 - continued  ?  famotidine (PEPCID) 20 MG tablet, Take 20 mg by mouth 2 (two) times a day., Disp: , Rfl:   ?  gabapentin (NEURONTIN) 300 MG capsule, TAKE 1 CAPSULE BY MOUTH WITH BREAKFAST, 1 WITH LUNCH, 1 WITH DINNER, AND 3 CAPSULES AT BEDTIME, Disp: 540 capsule, Rfl: 0 - Continued - now prescribed per primary care  ?  hydrOXYzine (VISTARIL) 50 MG capsule, , Disp: , Rfl:   ?  losartan (COZAAR) 100 MG tablet, Take 100 mg by mouth daily., Disp: , Rfl:   ?  metoprolol (LOPRESSOR) 25 MG tablet, Take 1 tablet by mouth 2 (two) times a day., Disp: , Rfl:   ?  ondansetron (ZOFRAN-ODT) 4 MG disintegrating tablet, , Disp: , Rfl:   ?  traZODone (DESYREL) 100 MG tablet, Take 100 mg by mouth bedtime as needed for sleep., Disp: , Rfl:   Standing order for IV fluids for the pancreatic flare up per primary care  Oxycodone 10mg she is getting #50/month    Interventional: Knee  "injections with Screven and Synvisc. She indicates she has not seen much difference. She does feel the pain is a bit less but she continues to struggle with the stability. She indicates there is a specialty brace. She indicates she has some     Rehabilitation: She indicates no PT at this point.  HEP: She is doing some stuff she learned in PT    Review of Systems   Constitutional- + activity intolerance  Musculoskeletal- +pain, + swelling  Neuro- - cognitive changes, - radicular  GI: - constipation  :  - urinary difficulty  Psych-  + mood concerns    Social  No family history on file.  History   Smoking Status   ? Former Smoker   Smokeless Tobacco   ? Never Used     Comment: On the edwardo-controlhailer     History   Alcohol use Not on file     History   Drug Use Not on file     History   Sexual Activity   ? Sexual activity: Not on file       Objective:     Vitals:    03/21/18 0858   BP: (!) 132/97   Pulse: 76   Resp: 16   Weight: 155 lb (70.3 kg)   Height: 5' 5\" (1.651 m)   PainSc:   3       Constitutional:  Pleasant and cooperative female who presents alone today.   Psychiatric: Mood and affect are appropriate for the situation, setting and topic of discussion.  Patient does not appear sedated.  Integumentary:  Observed skin WNL.   HEENT: EOM's grossly intact.    Chest: Breathing is non-labored.   Neurological:  Alert and oriented in all spheres including: time, place, person and situation.  Durable Medical Equipment: none    Diagnostics:     Imaging:  No new imaging available to review    :  Dated 3/21/2018         STEPHANIE Score: 46    Assessment:   Megan Burrell is a 52 y.o. female seen in clinic today for chronic low back pain, right buttock pain and left knee pain. She has a hx of pancreatitis and will have some small increases in pain however she has not been in the hospital in about a year. She does have a standing order for IV fluids when she does get into the vomiting an this is offering a bit of assistance.  "       She has completely tapered off the opioids. Continuing forward the focus will remain on a non-opioid plan of care via the pain center. It was noted there were some cognitive issues, and issues with the UDT. The decision was made with the pt to end opioids. In reviewing the  it is noted she is getting some oxycodone from her primary care and it appears her primary care took over the gabapentin. I think it is reasonable.       Celiac plexus block is an option for acute pancreatic pain flare-ups.       She is intermittently working with mental health - weather has played a factor.     We discussed PT as there appears to be some kinetic chain considerations from the altered gait associated with the left knee. I am encouraging Yoga or Pilate's a gentle approach as remaining active and strong continues to be a goal.     I will see her in 6 months.     Plan:   Plan/NextSteps:     Follow up: 6 month    Education:   Please call Monday-Friday for problems or questions and one of the clinical support staff (CSS) will help to get things figured out. The number is [phone]. Some folks are using [employer] to send an e-mail ([employer] message). Please remember some issues require an office visit.     Today we reviewed the plan of care and answered questions.      Records: Reviewed to assist with preparation for the office visit and are reflected throughout the note.    Health Maintenance: Please continue to see primary care for general medical prevention and illness care.    Rehabilitation: We discussed yoga and piliates     We can always have you see [facility] for yoga or for pilates    We discussed that considering PT is reasonable for the issues related Kinetic Chain     Mental health: Continue with [facility]    Medication:   Medication prescribed today:none it looks like your primary care has taken over the medication management.     Patient Arrived @ 0845 for a 0900 appointment.     TT: 0904 - 0923  CT: over half  spent in education and counseling as outlined in the plan.      Hodan Ramos APRN [facility]  [address] [facility] [address]   [phone]  [phone]

## 2021-07-03 NOTE — ADDENDUM NOTE
Addendum Note by Demetra Dutta at 11/27/2018 11:59 PM     Author: Demetra Dutta Service: -- Author Type: --    Filed: 12/6/2018 12:21 PM Date of Service: 11/27/2018 11:59 PM Status: Signed    : Demetra Dutta    Encounter addended by: Demetra Dutta on: 12/6/2018 12:21 PM      Actions taken: Charge Capture section accepted

## 2021-07-03 NOTE — ADDENDUM NOTE
Addendum Note by Hodan Ramos CNP at 9/21/2018  9:08 AM     Author: Hodan Ramos CNP Service: -- Author Type: Nurse Practitioner    Filed: 9/21/2018  9:08 AM Date of Service: 9/21/2018  9:08 AM Status: Signed    : Hodan Ramos CNP (Nurse Practitioner)    Encounter addended by: Hodan Ramos CNP on: 9/21/2018  9:08 AM<BR>     Actions taken: Sign clinical note

## 2021-07-03 NOTE — ADDENDUM NOTE
Addendum Note by Urmila Salinas LPCC at 10/22/2020 10:00 AM     Author: Urmila Salinas LPCC Service: -- Author Type: [facility]    Filed: 10/23/2020  2:29 PM Date of Service: 10/22/2020 10:00 AM Status: Signed    : Urmila Salinas LPCC ([facility])    Encounter addended by: Urmila Salinas LPCC on: 10/23/2020  2:29 PM      Actions taken: Chief Complaint modified

## 2021-07-03 NOTE — ADDENDUM NOTE
Addendum Note by Demetra Dutta at 10/22/2020 10:00 AM     Author: Demetra Dutta Service: -- Author Type: --    Filed: 10/26/2020  7:39 AM Date of Service: 10/22/2020 10:00 AM Status: Signed    : Demtera Dutta    Encounter addended by: Demetra Dutta on: 10/26/2020  7:39 AM      Actions taken: Charge Capture section accepted

## 2021-07-03 NOTE — ADDENDUM NOTE
Addendum Note by Mahad Melendez MD at 2/21/2019  3:12 PM     Author: Mahad Melendez MD Service: -- Author Type: Physician    Filed: 2/21/2019  3:12 PM Date of Service: 2/21/2019  3:12 PM Status: Signed    : Mahad Melendez MD (Physician)       Addendum  created 02/21/19 1512 by Mahad Melendez MD    Delete clinical note

## 2021-07-03 NOTE — ADDENDUM NOTE
Addendum Note by Urmila Salinas LPCC at 9/27/2018  4:32 PM     Author: Urmila Salinas LPCC Service: -- Author Type: [facility]    Filed: 9/27/2018  4:32 PM Date of Service: 9/27/2018  4:32 PM Status: Signed    : Urmila Salinas LPCC ([facility])    Encounter addended by: EMERY Ko on: 9/27/2018  4:32 PM<BR>     Actions taken: Sign clinical note

## 2021-10-23 ENCOUNTER — HEALTH MAINTENANCE LETTER (OUTPATIENT)
Age: 56
End: 2021-10-23

## 2022-04-09 ENCOUNTER — HEALTH MAINTENANCE LETTER (OUTPATIENT)
Age: 57
End: 2022-04-09

## 2022-08-08 NOTE — ANESTHESIA PREPROCEDURE EVALUATION
Anesthesia Evaluation      Patient summary reviewed   History of anesthetic complications     Airway   Mallampati: II  Neck ROM: full   Pulmonary - negative ROS and normal exam    breath sounds clear to auscultation  (+) a smoker  (-) COPD, shortness of breath, sleep apnea                         Cardiovascular - negative ROS and normal exam  Exercise tolerance: > or = 4 METS  (+) hypertension, ,     (-) dysrhythmias  ECG reviewed  Rhythm: regular  Rate: normal,         Neuro/Psych - negative ROS     Endo/Other - negative ROS      GI/Hepatic/Renal    (+) GERD,       Comments: Crohn's disease good control          Dental - normal exam                          Anesthesia Plan  Planned anesthetic: peripheral nerve block and spinal  Discussed rare risks of bleeding, infection, nerve damage, failure and LAST. We discussed risks of headache, failure and low blood pressure. Pt wishes to proceed  AC NB for popc  ASA 2     Anesthetic plan and risks discussed with: patient    Post-op plan: routine recovery          
Statement Selected

## 2022-10-09 ENCOUNTER — HEALTH MAINTENANCE LETTER (OUTPATIENT)
Age: 57
End: 2022-10-09

## 2023-01-12 NOTE — PROGRESS NOTES
1/10/2018    Start time: 9:00 AM    Stop Time: 10:00 AM   Session # 1    Megan Burrell is a 52 y.o. female is being seen today for    Chief Complaint   Patient presents with     Mental Health Note   . History of Generalized Anxiety, Claustrophobia and Panic Attacks  Chronic pain syndrome  Mood disorder due to medical condition  TBI  R/O Major Depressive Disorder, Severe  R/O PTSD    New symptoms or complaints: None    Functional Impairment:   Personal: 4  Family: 4  Work: 4  Social:4    Clinical assessment of mental status: Megan Burrell presented on time.  She was open and cooperative, and dressed appropriately for this session and weather. Her memory was in tact, but she reports some memory concerns in daily living .  Her  speech was appropriate.  Language was appropriate.  Concentration and focus is mostly on task, but she was redirected at times. Psychosis is not noted or reported. She reports her mood is anxious .  Affect is congruent with speech.  Fund of knowledge is adequate. Insight is adequate for therapy.     Patient updated paperwork this session: PHQ-9 depression score is 14 and indicates moderately severe symptoms of depression, TREVIN-7 anxiety score is 13 and  indicates moderately severe symptoms of anxiety, WHODAS score is 35.42%, H1= 15, H2=8, H3=15, PANSI was not collected. Patient denies suicidal ideation, plan or means.     Suicidal/Homicidal Ideation present: None Reported This Session    Patient's impression of their current status: Patient reported she has been struggling with anxiety and has been having difficulty with managing chronic medical conditions. She reports she will continue to follow up with her medical providers. She continues to acknowledge that anxiety and depression symptoms are triggered by her chronic pain and other medical symptoms, work stressors and other personal stressors as well. She reported difficulty back pain following a fall at home. Patient reported positive  feelings about the holidays and she enjoyed time with family. Patient engaged in personal values discussion and discussed some things she is looking forward to. Patient identified she is working on consistently using healthy coping skills that help manage symptoms in daily living, including implementing meditation, letting go exercises and other stress relieving tools in daily living. Patient states she is seeing a reduction in symptoms by using these techniques. She discussed stressors at work and changes at home. She talked about tobacco use and was able to identify triggers, as well as barriers to quitting. She did not comment on alcohol use at this time. Patient participated in some sensory focused meditation that she found beneficial.     Therapist impression of patients current state: Patient appears to be struggling with her mental health and chronic pain symptoms. She seems to be more consistent with using meditation and grounding skills in daily living. It seems there are some methods of stress relief that she finds beneficial. Patient was encouraged to continue meditation/relaxation on a regular basis, as it does seem to benefit her. Patient seemed open to discussion on personal values and she seemed to gain insight. It appears symptoms of anxiety are difficult to manage, at times,  and stressors at work and home seem to be triggers. It appears she is working on setting more appropriate boundaries in the workplace. Praised patient for the efforts she is making and progress she is noticing. Patient seems to have a desire to quit smoking, and discussion will continue regarding triggers and barriers. Challenged her to be mindful in daily living regarding her triggers and emotional responses to smoking. Will continue to assess to R/O Major Depression. Will continue to assess for PTSD, if symptoms change or worsen. Presented short meditations in session and patient appeared to benefit.     Type of  psychotherapeutic technique provided: Client centered, CBT and Mindfulness    Progress toward short term goals: Patient reports following her pain center plan of care. Patient reports working on implementing coping skills, including meditation, to manage mental health and chronic pain symptoms. She reports engaging in leisure activities as she is able, but this has been challenging. She reports continued employment. Continue discussion on smoking cessation. Discussed patient's goals and treatment plan will be developed.     Review of long term goals: Discussed patient's goals and treatment plan will be developed.     Diagnosis: History of Generalized Anxiety, Claustrophobia and Panic Attacks  Chronic pain syndrome  Mood disorder due to medical condition  TBI  R/O Major Depressive Disorder, Severe  R/O PTSD    Plan and Follow up: Continue psychotherapy sessions every 2 weeks to further address chronic pain, depression and anxiety symptoms. Continue EMDR therapy and will continue resourcing. Implement healthy coping skills to manage symptoms. Practice container exercise and letting go exercise, as presented in session, in daily living. Practice sensory focused meditation as presented in session today. Consider meditation prior to bed to help with relaxation. Consider starting a handwork project to reduce boredom and increase focus in the moment. Continue discussing plan for smoking cessation. Consider acupuncture services. Follow pain center plan of care.       Discharge Criteria/Planning: Patient will continue with follow-up until therapy can be discontinued without return of signs and symptoms.    Urmila Salinas 1/10/2018       4 = No assist / stand by assistance

## 2023-03-25 ENCOUNTER — HEALTH MAINTENANCE LETTER (OUTPATIENT)
Age: 58
End: 2023-03-25

## 2023-05-27 ENCOUNTER — HEALTH MAINTENANCE LETTER (OUTPATIENT)
Age: 58
End: 2023-05-27
